# Patient Record
Sex: MALE | Race: WHITE | NOT HISPANIC OR LATINO | Employment: OTHER | URBAN - METROPOLITAN AREA
[De-identification: names, ages, dates, MRNs, and addresses within clinical notes are randomized per-mention and may not be internally consistent; named-entity substitution may affect disease eponyms.]

---

## 2020-09-26 ENCOUNTER — IMMUNIZATION (OUTPATIENT)
Dept: SOCIAL WORK | Facility: CLINIC | Age: 65
End: 2020-09-26
Payer: MEDICARE

## 2020-09-26 DIAGNOSIS — Z23 NEED FOR VACCINATION: ICD-10-CM

## 2020-09-26 PROCEDURE — G0008 ADMIN INFLUENZA VIRUS VAC: HCPCS | Performed by: REGISTERED NURSE

## 2020-09-26 PROCEDURE — 90732 PPSV23 VACC 2 YRS+ SUBQ/IM: CPT | Performed by: REGISTERED NURSE

## 2020-09-26 PROCEDURE — G0009 ADMIN PNEUMOCOCCAL VACCINE: HCPCS | Performed by: REGISTERED NURSE

## 2020-09-26 PROCEDURE — 90662 IIV NO PRSV INCREASED AG IM: CPT | Performed by: REGISTERED NURSE

## 2020-10-14 ENCOUNTER — HOSPITAL ENCOUNTER (OUTPATIENT)
Dept: LAB | Facility: MEDICAL CENTER | Age: 65
End: 2020-10-14
Attending: FAMILY MEDICINE
Payer: MEDICARE

## 2020-10-14 LAB
BASOPHILS # BLD AUTO: 1 % (ref 0–1.8)
BASOPHILS # BLD: 0.1 K/UL (ref 0–0.12)
CHOLEST SERPL-MCNC: 225 MG/DL (ref 100–199)
EOSINOPHIL # BLD AUTO: 0.24 K/UL (ref 0–0.51)
EOSINOPHIL NFR BLD: 2.4 % (ref 0–6.9)
ERYTHROCYTE [DISTWIDTH] IN BLOOD BY AUTOMATED COUNT: 45.3 FL (ref 35.9–50)
HCT VFR BLD AUTO: 48.4 % (ref 42–52)
HDLC SERPL-MCNC: 41 MG/DL
HGB BLD-MCNC: 16.5 G/DL (ref 14–18)
IMM GRANULOCYTES # BLD AUTO: 0.04 K/UL (ref 0–0.11)
IMM GRANULOCYTES NFR BLD AUTO: 0.4 % (ref 0–0.9)
LDLC SERPL CALC-MCNC: 139 MG/DL
LYMPHOCYTES # BLD AUTO: 2.39 K/UL (ref 1–4.8)
LYMPHOCYTES NFR BLD: 23.8 % (ref 22–41)
MCH RBC QN AUTO: 30.6 PG (ref 27–33)
MCHC RBC AUTO-ENTMCNC: 34.1 G/DL (ref 33.7–35.3)
MCV RBC AUTO: 89.8 FL (ref 81.4–97.8)
MONOCYTES # BLD AUTO: 0.8 K/UL (ref 0–0.85)
MONOCYTES NFR BLD AUTO: 8 % (ref 0–13.4)
NEUTROPHILS # BLD AUTO: 6.48 K/UL (ref 1.82–7.42)
NEUTROPHILS NFR BLD: 64.4 % (ref 44–72)
NRBC # BLD AUTO: 0 K/UL
NRBC BLD-RTO: 0 /100 WBC
PLATELET # BLD AUTO: 315 K/UL (ref 164–446)
PMV BLD AUTO: 9.9 FL (ref 9–12.9)
PSA SERPL-MCNC: 0.79 NG/ML (ref 0–4)
RBC # BLD AUTO: 5.39 M/UL (ref 4.7–6.1)
TRIGL SERPL-MCNC: 226 MG/DL (ref 0–149)
WBC # BLD AUTO: 10.1 K/UL (ref 4.8–10.8)

## 2020-10-14 PROCEDURE — 84153 ASSAY OF PSA TOTAL: CPT | Mod: GA

## 2020-10-14 PROCEDURE — 85025 COMPLETE CBC W/AUTO DIFF WBC: CPT

## 2020-10-14 PROCEDURE — 80061 LIPID PANEL: CPT | Mod: GA

## 2020-10-14 PROCEDURE — 83036 HEMOGLOBIN GLYCOSYLATED A1C: CPT | Mod: GA

## 2020-10-14 PROCEDURE — 36415 COLL VENOUS BLD VENIPUNCTURE: CPT | Mod: GA

## 2020-10-15 LAB
EST. AVERAGE GLUCOSE BLD GHB EST-MCNC: 114 MG/DL
HBA1C MFR BLD: 5.6 % (ref 0–5.6)

## 2020-12-22 ENCOUNTER — PATIENT OUTREACH (OUTPATIENT)
Dept: HEALTH INFORMATION MANAGEMENT | Facility: OTHER | Age: 65
End: 2020-12-22

## 2020-12-23 NOTE — PROGRESS NOTES
Outcome: No answer- Schedule with new PCP     Please transfer to [facility]  at [phone] when patient returns call.   [facility] Verified: yes   Attempt # 1

## 2021-01-29 ENCOUNTER — TELEPHONE (OUTPATIENT)
Dept: SCHEDULING | Facility: IMAGING CENTER | Age: 66
End: 2021-01-29

## 2021-02-05 ENCOUNTER — OFFICE VISIT (OUTPATIENT)
Dept: MEDICAL GROUP | Facility: MEDICAL CENTER | Age: 66
End: 2021-02-05
Payer: MEDICARE

## 2021-02-05 VITALS
BODY MASS INDEX: 36.82 KG/M2 | TEMPERATURE: 97.6 F | SYSTOLIC BLOOD PRESSURE: 116 MMHG | RESPIRATION RATE: 16 BRPM | WEIGHT: 263 LBS | DIASTOLIC BLOOD PRESSURE: 74 MMHG | HEART RATE: 70 BPM | HEIGHT: 71 IN | OXYGEN SATURATION: 97 %

## 2021-02-05 DIAGNOSIS — Z12.12 SCREENING FOR COLORECTAL CANCER: ICD-10-CM

## 2021-02-05 DIAGNOSIS — E66.9 OBESITY (BMI 30-39.9): ICD-10-CM

## 2021-02-05 DIAGNOSIS — Z12.11 SCREENING FOR COLORECTAL CANCER: ICD-10-CM

## 2021-02-05 DIAGNOSIS — Z11.59 NEED FOR HEPATITIS C SCREENING TEST: ICD-10-CM

## 2021-02-05 DIAGNOSIS — L29.9 EAR ITCHING: ICD-10-CM

## 2021-02-05 DIAGNOSIS — E78.5 DYSLIPIDEMIA: ICD-10-CM

## 2021-02-05 PROCEDURE — 99203 OFFICE O/P NEW LOW 30 MIN: CPT | Performed by: NURSE PRACTITIONER

## 2021-02-05 ASSESSMENT — PATIENT HEALTH QUESTIONNAIRE - PHQ9: CLINICAL INTERPRETATION OF PHQ2 SCORE: 0

## 2021-02-05 NOTE — PROGRESS NOTES
Subjective:      Hima Barrios is a 65 y.o. male who presents with Establish Care and Other (itching ears)        CC: This is a pleasant 65-year-old gentleman here today to establish care for issues including dyslipidemia and obesity.  He states had not been to a medical provider in years and was just recently going to a PCP in [place] until he changed insurances.    HPI       1. Dyslipidemia  Patient reports he did his first lab work in years and I am able to review the consult notes from his previous PCP as well as the lab results.  His LDL was 139 and total cholesterol 225 with elevated triglycerides of 226 and normal HDL at 41 when done 4 months ago..  His PCP talk to him about starting medicine because of his elevated 10-year cardiac risk score but he declined.  He would like to recheck his levels.    Patient's PSA was normal, CBC normal, and A1c at 5.6    2. Obesity (BMI 30-39.9)  BMI is elevated the patient states he has been trying to lose weight but has not been very successful.    3. Screening for colorectal cancer  Patient states it has been over 10 years since his last colonoscopy.    4. Need for hepatitis C screening test  Patient needs screening     5.  Ear itching:    Patient reports he has had on and off pruritus of his ear canals and his previous PCP prescribed Cipro otic which she could not afford filling.  He states there is no purulent discharge or deep ear pain but he does feel the need to scratch his ear canals frequently over the past year.  No past medical history on file.  Social History     Socioeconomic History   • Marital status:      Spouse name: Not on file   • Number of children: Not on file   • Years of education: Not on file   • Highest education level: Not on file   Occupational History   • Not on file   Social Needs   • Financial resource strain: Not on file   • Food insecurity     Worry: Not on file     Inability: Not on file   • Transportation needs     Medical:  "Not on file     Non-medical: Not on file   Tobacco Use   • Smoking status: Never Smoker   • Smokeless tobacco: Never Used   Substance and Sexual Activity   • Alcohol use: Yes     Comment: ocaccionaly   • Drug use: Yes     Types: Marijuana     Comment: daily   • Sexual activity: Not Currently     Partners: Female   Lifestyle   • Physical activity     Days per week: Not on file     Minutes per session: Not on file   • Stress: Not on file   Relationships   • Social connections     Talks on phone: Not on file     Gets together: Not on file     Attends Baptism service: Not on file     Active member of club or organization: Not on file     Attends meetings of clubs or organizations: Not on file     Relationship status: Not on file   • Intimate partner violence     Fear of current or ex partner: Not on file     Emotionally abused: Not on file     Physically abused: Not on file     Forced sexual activity: Not on file   Other Topics Concern   • Not on file   Social History Narrative   • Not on file     No current outpatient medications on file.     No current facility-administered medications for this visit.      Family History   Problem Relation Age of Onset   • Cancer Mother         lung   • Cancer Father         brain         Review of Systems   Skin: Positive for itching.   All other systems reviewed and are negative.         Objective:     /74 (BP Location: Right arm, Patient Position: Sitting, BP Cuff Size: Adult)   Pulse 70   Temp 36.4 °C (97.6 °F) (Temporal)   Resp 16   Ht 1.803 m (5' 11\")   Wt 119 kg (263 lb)   SpO2 97%   BMI 36.68 kg/m²      Physical Exam  Vitals signs and nursing note reviewed.   Constitutional:       General: He is not in acute distress.     Appearance: He is well-developed. He is not diaphoretic.   HENT:      Head: Normocephalic and atraumatic.      Comments: No obvious otitis media or otitis externa with small amount of cerumen in the canals bilaterally     Right Ear: External " ear normal.      Left Ear: External ear normal.      Nose: Nose normal.   Eyes:      General:         Right eye: No discharge.         Left eye: No discharge.      Conjunctiva/sclera: Conjunctivae normal.   Neck:      Musculoskeletal: Normal range of motion and neck supple.      Thyroid: No thyromegaly.      Trachea: No tracheal deviation.   Cardiovascular:      Rate and Rhythm: Normal rate and regular rhythm.      Heart sounds: Normal heart sounds. No murmur.   Pulmonary:      Effort: Pulmonary effort is normal. No respiratory distress.      Breath sounds: Normal breath sounds. No wheezing or rales.   Lymphadenopathy:      Cervical: No cervical adenopathy.   Skin:     General: Skin is warm and dry.      Findings: No erythema or rash.   Neurological:      Mental Status: He is alert and oriented to person, place, and time.      Coordination: Coordination normal.   Psychiatric:         Behavior: Behavior normal.         Thought Content: Thought content normal.         Judgment: Judgment normal.                 Assessment/Plan:        1. Dyslipidemia  Patient's 10-year cardiac risk assessment score is elevated at 13%.  I explained to him the implications of this for and the pros and cons of starting statin therapy as did his previous PCP.  I recommended weight loss and a low-fat diet.  He states he would prefer to again try dieting and losing weight and rechecking in 6 months.  I explained the risk of untreated dyslipidemia.  - Lipid Profile; Future  - Comp Metabolic Panel; Future    2. Ear itching  I recommended some over-the-counter hydrocortisone cream on a Q-tip to the areas bilaterally and if it does not improve I would refer him to ENT.    3. Obesity (BMI 30-39.9)    - Patient identified as having weight management issue.  Appropriate orders and counseling given.    4. Screening for colorectal cancer  I explained to patient the benefits of a colonoscopy over a Cologuard and he has not had a colonoscopy in over 10  years so I will place a referral today  - REFERRAL TO GI FOR COLONOSCOPY      5. Need for hepatitis C screening test  Patient will do hepatitis C screening

## 2021-03-03 DIAGNOSIS — Z23 NEED FOR VACCINATION: ICD-10-CM

## 2021-05-11 ENCOUNTER — PATIENT OUTREACH (OUTPATIENT)
Dept: HEALTH INFORMATION MANAGEMENT | Facility: OTHER | Age: 66
End: 2021-05-11

## 2021-10-07 ENCOUNTER — TELEMEDICINE (OUTPATIENT)
Dept: MEDICAL GROUP | Facility: MEDICAL CENTER | Age: 66
End: 2021-10-07
Payer: MEDICARE

## 2021-10-07 VITALS — TEMPERATURE: 96.9 F

## 2021-10-07 DIAGNOSIS — E66.9 OBESITY (BMI 30-39.9): ICD-10-CM

## 2021-10-07 DIAGNOSIS — E78.5 DYSLIPIDEMIA: ICD-10-CM

## 2021-10-07 DIAGNOSIS — L29.9 ITCHING OF EAR: ICD-10-CM

## 2021-10-07 PROCEDURE — 99213 OFFICE O/P EST LOW 20 MIN: CPT | Mod: 95 | Performed by: FAMILY MEDICINE

## 2021-10-07 NOTE — PROGRESS NOTES
Virtual Visit: Established Patient   This visit was conducted via Zoom  using secure and encrypted videoconferencing technology. The patient was in a private location in the state of [place].    The patient's identity was confirmed and verbal consent was obtained for this virtual visit.      CC: New patient: Hyperlipidemia, obesity, which is                                                                                                                               HPI:   Hima presents today with the following.    Dyslipidemia  Most lipid panel was checked in October 2020:     Ref Range & Units 11 mo ago   Cholesterol,Tot 100 - 199 mg/dL 225 High     Triglycerides 0 - 149 mg/dL 226 High     HDL >=40 mg/dL 41    LDL <100 mg/dL 139 High         No history of diabetes, CAD, or stroke.  Patient is currently not on medication.ASCAD score is 14%.      Obesity (BMI 30-39.9)  BMI is 36.The medical rationale for weight loss in obese individuals is that obesity is associated with a significant increase in mortality, and many health risks including type 2 diabetes mellitus, hypertension, dyslipidemia, and coronary heart disease. The benefits of weight loss include a reduction in the rate of progression from impaired glucose tolerance to diabetes, blood pressure in hypertensive patients, and lipid levels in higher risk patients. Other noncardiac benefits of weight loss include reductions in urinary incontinence, sleep apnea, and depression, and improvements in quality of life, physical functioning, and mobility.Recommend lifestyle modification: exercise 30 minutes per day 5 days per week. Recommend also portion control.    Itching of ear  Patient has been complaining of an itchy external ear canal for a while.  Denies any hearing problem, denies any dizziness or vertigo.  Patient is concerned, he wonders if he needs to be seen by ENT.  Discussed with patient that he needs face-to-face encounter to examine his ear, and decide  if he needs to be seen by ENT.        Patient Active Problem List    Diagnosis Date Noted   • Obesity (BMI 30-39.9) 02/05/2021   • Dyslipidemia 02/05/2021       No current outpatient medications on file.     No current facility-administered medications for this visit.         Allergies as of 10/07/2021   • (No Known Allergies)        ROS:  Denies, chest pain, Shortness of breath, Edema.     Temp 36.1 °C (96.9 °F)       Physical Exam:  Constitutional: Alert, no distress, well-groomed.  Skin: No rashes in visible areas.  Eye: Round. Conjunctiva clear, lNo icterus.   ENMT: Lips pink without lesions, good dentition, moist mucous membranes. Phonation normal.  Neck: No masses, no thyromegaly. Moves freely without pain.  CV: Pulse as reported by patient  Respiratory: Unlabored respiratory effort, no cough or audible wheeze  Psych: Alert and oriented x3, normal affect and mood.          Assessment and Plan.   66 y.o. male with the following issues.    1. Dyslipidemia  ASCAD score is 14%  Will discuss statin at next visit    2. Obesity (BMI 30-39.9)  BMI 36.  Patient is counseled on lifestyle modification    3. Itching of ear  Probably dryness of external ear.  He wonders if he needs to see an ENT.  Patient advised to make a face to face encounter to examine his ears and find out if he needs to see ENT

## 2021-10-27 ENCOUNTER — TELEPHONE (OUTPATIENT)
Dept: MEDICAL GROUP | Facility: MEDICAL CENTER | Age: 66
End: 2021-10-27

## 2021-10-27 NOTE — TELEPHONE ENCOUNTER
ESTABLISHED PATIENT PRE-VISIT PLANNING     Annual Wellness Visit Over Due    Phone Number Called: [phone] (home)   Call outcome: Spoke to patient regarding message below.  Message: Appointment scheduled with , Thursday, 10/28/2021 at 3:00 PM, [address], [address]. Request patient arrive 10-15 minutes early for check-in. Option for Virtual Visit declined.       Patient was contacted to complete PVP.     Note: Patient will not be contacted if there is no indication to call.     1.  Reviewed notes from the last few office visits within the medical group: Yes    2.  If any orders were placed at last visit or intended to be done for this visit (i.e. 6 mos follow-up), do we have Results/Consult Notes?         •  Labs - Labs ordered by prior PCP Betty, NOT completed. Patient advised to complete prior to next appointment.   -Labs: 8-10 hours fasting  During call to patient, patient indicated he has not been fasting, and labs will not be completed prior to visit scheduled tomorrow 10/28/21 with .  Note: If patient appointment is for lab review and patient did not complete labs, check with provider if OK to reschedule patient until labs completed.       •  Imaging - Imaging was not ordered at last office visit.       •  Referrals - Referral ordered, patient was seen and consult notes were requested from [facility]. Care Teams updated  YES.    -Colonoscopy: Called and spoke to [facility]. Per their office, patient was seen on  06/03/2021 with Dr. Myers. No future follow up appointments scheduled. Care Teams updated. Medical records requested.    3. Is this appointment scheduled as a Hospital Follow-Up? No    4.  Immunizations were updated in Epic using Reconcile Outside Information activity? Yes    5.  Patient is due for the following Health Maintenance Topics:   Health Maintenance Due   Topic Date Due   • Annual Wellness Visit  Never done   • COLORECTAL CANCER SCREENING   Never done   • HEPATITIS C SCREENING  Never done   • IMM INFLUENZA (1) 09/01/2021       6.  AHA (Pulse8) form printed for Provider? No, patient does not have any open alerts

## 2021-10-27 NOTE — LETTER
[facility]  Jose Kim M.D.  [address]  [place] [address]  Fax: [phone]   Authorization for Release/Disclosure of   Protected Health Information   Name: HENRIQUE MALCOLM : 1955 SSN: [id number]   Address: [address] [place] [address] [address]  [place] [address] Phone:    [phone] (home)    I authorize the entity listed below to release/disclose the PHI below to:   [facility]/Jose Kim M.D. and Jose Kim M.D.   Provider or Entity Name:  Dr. Vinny Myers M.D.   Address   [facility]   Phone:  [phone]    Fax:  [phone]   Reason for request: continuity of care   Information to be released:    [  ] LAST COLONOSCOPY,  including any PATH REPORT and follow-up  [  ] LAST FIT/COLOGUARD RESULT [  ] LAST DEXA  [  ] LAST MAMMOGRAM  [  ] LAST PAP  [  ] LAST LABS [  ] RETINA EXAM REPORT  [  ] IMMUNIZATION RECORDS  [XXXX ] Release all info      [  ] Check here and initial the line next to each item to release ALL health information INCLUDING  _____ Care and treatment for drug and / or alcohol abuse  _____ HIV testing, infection status, or AIDS  _____ Genetic Testing    DATES OF SERVICE OR TIME PERIOD TO BE DISCLOSED: _____________  I understand and acknowledge that:  * This Authorization may be revoked at any time by you in writing, except if your health information has already been used or disclosed.  * Your health information that will be used or disclosed as a result of you signing this authorization could be re-disclosed by the recipient. If this occurs, your re-disclosed health information may no longer be protected by State or Federal laws.  * You may refuse to sign this Authorization. Your refusal will not affect your ability to obtain treatment.  * This Authorization becomes effective upon signing and will  on (date) __________.      If no date is indicated, this Authorization will  one (1) year from the signature date.    Name:  Hima Barrios    Signature: Continuity of Care Date:     10/27/2021       PLEASE FAX REQUESTED RECORDS BACK TO: [phone]

## 2021-10-28 ENCOUNTER — OFFICE VISIT (OUTPATIENT)
Dept: MEDICAL GROUP | Facility: MEDICAL CENTER | Age: 66
End: 2021-10-28
Payer: MEDICARE

## 2021-10-28 VITALS
TEMPERATURE: 98.1 F | SYSTOLIC BLOOD PRESSURE: 120 MMHG | WEIGHT: 261.25 LBS | DIASTOLIC BLOOD PRESSURE: 70 MMHG | OXYGEN SATURATION: 96 % | HEART RATE: 92 BPM | BODY MASS INDEX: 36.57 KG/M2 | HEIGHT: 71 IN | RESPIRATION RATE: 16 BRPM

## 2021-10-28 DIAGNOSIS — H92.02 EARACHE ON LEFT: ICD-10-CM

## 2021-10-28 DIAGNOSIS — L29.9 ITCHING OF EAR: ICD-10-CM

## 2021-10-28 PROCEDURE — 99214 OFFICE O/P EST MOD 30 MIN: CPT | Performed by: FAMILY MEDICINE

## 2021-10-28 NOTE — PROGRESS NOTES
"CC: itchy ear/ earache    HPI:   Hima was seen recently in a virtual visit for an itchy ear/ left ear ache. Advised to be seen in person to be examined properly, that is why he is here today. Patient has been having this problem for a while, recently gets worse . Denies any hearing problem, denies any dizziness or vertigo.        Patient Active Problem List    Diagnosis Date Noted   • Obesity (BMI 30-39.9) 02/05/2021   • Dyslipidemia 02/05/2021       No current outpatient medications on file.     No current facility-administered medications for this visit.         Allergies as of 10/28/2021   • (No Known Allergies)        ROS: Denies any chest pain, Shortness of breath, Changes bowel or bladder, Lower extremity edema.    Physical Exam:  /70 (BP Location: Right arm, Patient Position: Sitting, BP Cuff Size: Adult)   Pulse 92   Temp 36.7 °C (98.1 °F) (Temporal)   Resp 16   Ht 1.803 m (5' 11\")   Wt 118 kg (261 lb 3.9 oz)   SpO2 96%   BMI 36.44 kg/m²   Gen.: Well-developed, well-nourished, no apparent distress,pleasant and cooperative with the examination  Ears: Normal right ear, left ear: Unable to see the tympanic membrane ,possible perforated tympanic membrane with a lesion, .        Assessment and Plan.   66 y.o. male     1. Itching of ear/ Earache on left  Possible perforated tympanic membrane with a lesion, possible cholesteatoma.    - Referral to ENT      "

## 2021-11-03 ENCOUNTER — TELEPHONE (OUTPATIENT)
Dept: MEDICAL GROUP | Facility: MEDICAL CENTER | Age: 66
End: 2021-11-03

## 2021-11-03 NOTE — TELEPHONE ENCOUNTER
I do not think the process for that.  However I placed a referral for patient already when I saw him recently.  But other things do I need to do?

## 2021-11-03 NOTE — TELEPHONE ENCOUNTER
Nithya from [facility] left a voicemail    Patient needs and authorization to see a specialist outside of [place], he is going out of town and needs authorization for a referral and chart notes to say it medically necessary to see a specialist. Please advise

## 2022-02-16 ENCOUNTER — PATIENT MESSAGE (OUTPATIENT)
Dept: HEALTH INFORMATION MANAGEMENT | Facility: OTHER | Age: 67
End: 2022-02-16
Payer: MEDICARE

## 2022-05-09 PROBLEM — R73.9 HYPERGLYCEMIA: Status: ACTIVE | Noted: 2022-05-09

## 2022-05-09 PROBLEM — R73.03 PRE-DIABETES: Status: ACTIVE | Noted: 2022-05-09

## 2022-05-09 PROBLEM — R03.0 ELEVATED BLOOD PRESSURE READING WITHOUT DIAGNOSIS OF HYPERTENSION: Status: ACTIVE | Noted: 2022-05-09

## 2022-05-09 PROBLEM — E66.9 OBESITY (BMI 30-39.9): Status: RESOLVED | Noted: 2021-02-05 | Resolved: 2022-05-09

## 2022-05-09 PROBLEM — F12.90 MARIJUANA USE: Status: ACTIVE | Noted: 2022-05-09

## 2022-05-09 PROBLEM — I73.9 PERIPHERAL ARTERIAL DISEASE (HCC): Status: ACTIVE | Noted: 2022-05-09

## 2022-05-09 PROBLEM — E66.01 MORBID OBESITY (HCC): Status: ACTIVE | Noted: 2022-05-09

## 2022-05-09 PROBLEM — E88.810 DYSMETABOLIC SYNDROME: Status: ACTIVE | Noted: 2022-05-09

## 2022-05-20 ENCOUNTER — TELEPHONE (OUTPATIENT)
Dept: MEDICAL GROUP | Facility: MEDICAL CENTER | Age: 67
End: 2022-05-20
Payer: MEDICARE

## 2022-05-20 NOTE — TELEPHONE ENCOUNTER
ESTABLISHED PATIENT PRE-VISIT PLANNING     Patient was NOT contacted to complete PVP.     Note: Patient will not be contacted if there is no indication to call.     1.  Reviewed notes from the last few office visits within the medical group: Yes    2.  If any orders were placed at last visit or intended to be done for this visit (i.e. 6 mos follow-up), do we have Results/Consult Notes?         •  Labs - Labs were not ordered at last office visit.   Last office visit with PCP Provider Dr. Kim was on 10/28/2021.   Note: If patient appointment is for lab review and patient did not complete labs, check with provider if OK to reschedule patient until labs completed.       •  Imaging - Imaging was not ordered at last office visit.          •  Referrals - Referral ordered, patient has NOT been seen.    -Otolaryngology: Called and spoke to [facility]. Per their office, patient not seen and not in their system. Referral Status Authorized.     3. Is this appointment scheduled as a Hospital Follow-Up? No    4.  Immunizations were updated in Epic using Reconcile Outside Information activity? Yes    5.  Patient is due for the following Health Maintenance Topics:   Health Maintenance Due   Topic Date Due   • HEPATITIS C SCREENING  Never done   • COVID-19 Vaccine (3 - Booster for Pfizer series) 08/22/2021   • IMM PNEUMOCOCCAL VACCINE: 65+ Years (2 - PCV) 09/26/2021       6.  AHA (Pulse8) form printed for Provider? No, patient does not have any open alerts, Compliant

## 2022-05-23 ENCOUNTER — APPOINTMENT (OUTPATIENT)
Dept: MEDICAL GROUP | Facility: MEDICAL CENTER | Age: 67
End: 2022-05-23
Payer: MEDICARE

## 2022-05-25 ENCOUNTER — OFFICE VISIT (OUTPATIENT)
Dept: MEDICAL GROUP | Facility: MEDICAL CENTER | Age: 67
End: 2022-05-25
Payer: MEDICARE

## 2022-05-25 VITALS
TEMPERATURE: 98.1 F | SYSTOLIC BLOOD PRESSURE: 110 MMHG | HEART RATE: 114 BPM | OXYGEN SATURATION: 94 % | DIASTOLIC BLOOD PRESSURE: 60 MMHG | RESPIRATION RATE: 16 BRPM | WEIGHT: 271 LBS | BODY MASS INDEX: 37.94 KG/M2 | HEIGHT: 71 IN

## 2022-05-25 DIAGNOSIS — E78.5 DYSLIPIDEMIA: ICD-10-CM

## 2022-05-25 DIAGNOSIS — E66.01 SEVERE OBESITY (BMI 35.0-39.9) WITH COMORBIDITY (HCC): ICD-10-CM

## 2022-05-25 DIAGNOSIS — Z00.00 MEDICARE ANNUAL WELLNESS VISIT, SUBSEQUENT: ICD-10-CM

## 2022-05-25 DIAGNOSIS — R73.03 PREDIABETES: ICD-10-CM

## 2022-05-25 DIAGNOSIS — Z13.0 SCREENING FOR DEFICIENCY ANEMIA: ICD-10-CM

## 2022-05-25 PROBLEM — R03.0 ELEVATED BLOOD PRESSURE READING WITHOUT DIAGNOSIS OF HYPERTENSION: Status: RESOLVED | Noted: 2022-05-09 | Resolved: 2022-05-25

## 2022-05-25 PROBLEM — I73.9 PERIPHERAL ARTERIAL DISEASE (HCC): Status: RESOLVED | Noted: 2022-05-09 | Resolved: 2022-05-25

## 2022-05-25 PROBLEM — E88.810 DYSMETABOLIC SYNDROME: Status: RESOLVED | Noted: 2022-05-09 | Resolved: 2022-05-25

## 2022-05-25 PROCEDURE — G0439 PPPS, SUBSEQ VISIT: HCPCS | Performed by: FAMILY MEDICINE

## 2022-05-25 ASSESSMENT — PATIENT HEALTH QUESTIONNAIRE - PHQ9: CLINICAL INTERPRETATION OF PHQ2 SCORE: 0

## 2022-05-25 ASSESSMENT — ENCOUNTER SYMPTOMS: GENERAL WELL-BEING: GOOD

## 2022-05-25 ASSESSMENT — ACTIVITIES OF DAILY LIVING (ADL): BATHING_REQUIRES_ASSISTANCE: 0

## 2022-05-25 NOTE — PROGRESS NOTES
Chief Complaint   Patient presents with   • Annual Exam       HPI:  Hima Barrios is a 67 y.o. here for Medicare Annual Wellness Visit     Patient Active Problem List    Diagnosis Date Noted   • Dysmetabolic syndrome 05/09/2022   • Pre-diabetes 05/09/2022   • Marijuana use 05/09/2022   • Peripheral arterial disease (HCC) 05/09/2022   • Elevated blood pressure reading without diagnosis of hypertension 05/09/2022   • Morbid obesity (HCC) 05/09/2022   • Dyslipidemia 02/05/2021       No current outpatient medications on file.     No current facility-administered medications for this visit.          Current supplements as per medication list.     Allergies: Patient has no known allergies.    Current social contact/activities:      He  reports that he has never smoked. He has never used smokeless tobacco. He reports current alcohol use. He reports current drug use. Drug: Marijuana.  Counseling given: Not Answered      DPA/Advanced Directive:  Patient does not have an Advanced Directive.  A packet and workshop information was given on Advanced Directives.    ROS:    Gait: Uses no assistive device  Ostomy: No  Other tubes: No  Amputations: No  Chronic oxygen use: No  Last eye exam: 2019  Wears hearing aids: No   : Denies any urinary leakage during the last 6 months    Screening:    Depression Screening  Little interest or pleasure in doing things?  0 - not at all  Feeling down, depressed , or hopeless? 0 - not at all  Patient Health Questionnaire Score: 0     If depressive symptoms identified deferred to follow up visit unless specifically addressed in assessment and plan.    Interpretation of PHQ-9 Total Score   Score Severity   1-4 No Depression   5-9 Mild Depression   10-14 Moderate Depression   15-19 Moderately Severe Depression   20-27 Severe Depression    Screening for Cognitive Impairment  Three Minute Recall (daughter, heaven, mountain) 3/3    Guillermo clock face with all 12 numbers and set the hands to show 10 past  11.  Yes    Cognitive concerns identified deferred for follow up unless specifically addressed in assessment and plan.    Fall Risk Assessment  Has the patient had two or more falls in the last year or any fall with injury in the last year?  No    Safety Assessment  Throw rugs on floor.  No  Handrails on all stairs.  No  Good lighting in all hallways.  Yes  Difficulty hearing.  No  Patient counseled about all safety risks that were identified.    Functional Assessment ADLs  Are there any barriers preventing you from cooking for yourself or meeting nutritional needs?  No.    Are there any barriers preventing you from driving safely or obtaining transportation?  No.    Are there any barriers preventing you from using a telephone or calling for help?  No.    Are there any barriers preventing you from shopping?  No.    Are there any barriers preventing you from taking care of your own finances?  No.    Are there any barriers preventing you from managing your medications?  No.    Are there any barriers preventing you from showering, bathing or dressing yourself?  No.    Are you currently engaging in any exercise or physical activity?  Yes.     What is your perception of your health?  Good.      Health Maintenance Summary          Overdue - HEPATITIS C SCREENING (Once) Overdue - never done    No completion history exists for this topic.          Overdue - COVID-19 Vaccine (3 - Booster for Pfizer series) Overdue since 8/22/2021 03/22/2021  Imm Admin: PFIZER PURPLE CAP SARS-COV-2 VACCINATION (12+)    03/01/2021  Imm Admin: PFIZER PURPLE CAP SARS-COV-2 VACCINATION (12+)          Overdue - IMM PNEUMOCOCCAL VACCINE: 65+ Years (2 - PCV) Overdue since 9/26/2021 09/26/2020  Imm Admin: Pneumococcal polysaccharide vaccine (PPSV-23)          Postponed - IMM DTaP/Tdap/Td Vaccine (1 - Tdap) Postponed until 6/8/2024    No completion history exists for this topic.          Postponed - IMM ZOSTER VACCINES (1 of 2) Postponed until  8/11/2025    No completion history exists for this topic.          IMM INFLUENZA (Season Ended) Next due on 9/1/2022 09/26/2020  Imm Admin: Influenza Vaccine Adult HD          COLORECTAL CANCER SCREENING (COLONOSCOPY - Every 10 Years) Tentatively due on 6/3/2031    06/03/2021  REFERRAL TO GI FOR COLONOSCOPY          Annual Wellness Visit  Completed    05/09/2022  Level of Service: ANNUAL WELLNESS VISIT-INCLUDES PPPS SUBSEQUE*          IMM HEP B VACCINE (Series Information) Aged Out    No completion history exists for this topic.          IMM MENINGOCOCCAL VACCINE (MCV4) (Series Information) Aged Out    No completion history exists for this topic.                Patient Care Team:  Jose Kim M.D. as PCP - General (Geriatric Medicine - Family Medicine)  Henry Parks M.D. as Attending Team Physician (Geriatrics)  Vinny Myers M.D. as Attending Team Physician (Gastroenterology)        Social History     Tobacco Use   • Smoking status: Never Smoker   • Smokeless tobacco: Never Used   Vaping Use   • Vaping Use: Never used   Substance Use Topics   • Alcohol use: Yes     Comment: ocaccionaly   • Drug use: Yes     Types: Marijuana     Comment: daily     Family History   Problem Relation Age of Onset   • Cancer Mother         lung   • Cancer Father         brain     He  has a past medical history of Obesity (BMI 30-39.9) (2/5/2021).   Past Surgical History:   Procedure Laterality Date   • APPENDECTOMY         Exam:   There were no vitals taken for this visit. There is no height or weight on file to calculate BMI.    Hearing excellent.    Dentition good  Alert, oriented in no acute distress.  Eye contact is good, speech goal directed, affect calm    Assessment and Plan. The following treatment and monitoring plan is recommended:    67 y.o. male     1. Dyslipidemia  Last lipid panel was checked in 2020:  Component Ref Range & Units 1 yr ago    Cholesterol,Tot 100 - 199 mg/dL 225 High      Triglycerides 0 - 149 mg/dL 226 High     HDL >=40 mg/dL 41    LDL <100 mg/dL 139 High     Resulting Agency  [facility]     Currently not on medication.  Patient is counseled on lifestyle modification.  Repeat lipid panel.  Patient advised to return to the clinic to discuss the result.    - Lipid Profile; Future  - Subsequent Annual Wellness Visit - Includes PPPS ()    2. Severe obesity (BMI 35.0-39.9) with comorbidity (HCC)  BMI is 38.  Patient is counseled on lifestyle modification.  We will continue monitor    - Subsequent Annual Wellness Visit - Includes PPPS ()    3. Screening for deficiency anemia    - CBC WITH DIFFERENTIAL; Future  - Subsequent Annual Wellness Visit - Includes PPPS ()    4. Prediabetes  With history of prediabetes, however patient has been asymptomatic.  Not on medication.  We will continue monitor blood glucose    - Comp Metabolic Panel; Future  - Subsequent Annual Wellness Visit - Includes PPPS ()  - A1C    5. Medicare annual wellness visit, subsequent  Preventive measures and chronic medical issues reviewed.    - Subsequent Annual Wellness Visit - Includes PPPS ()        Services suggested: No services needed at this time  Health Care Screening: Age-appropriate preventive services recommended by USPTF and ACIP covered by Medicare were discussed today. Services ordered if indicated and agreed upon by the patient.  Referrals offered: Community-based lifestyle interventions to reduce health risks and promote self-management and wellness, fall prevention, nutrition, physical activity, tobacco-use cessation, weight loss, and mental health services as per orders if indicated.    Discussion today about general wellness and lifestyle habits:    · Prevent falls and reduce trip hazards; Cautioned about securing or removing rugs.  · Have a working fire alarm and carbon monoxide detector;   · Engage in regular physical activity and social activities     Follow-up: No follow-ups on  file.

## 2022-05-26 NOTE — PROGRESS NOTES
Outcome:Comprehensive Geriatric Assessment  Wednesday, May 19, 2021  11:00 am (1 hour)    Dr. Parks - [address] [facility]     Please transfer to Patient Outreach Team at [phone] when patient returns call.      Attempt #1      Detail Level: Zone Detail Level: Detailed Detail Level: Simple

## 2022-06-07 ENCOUNTER — HOSPITAL ENCOUNTER (OUTPATIENT)
Dept: LAB | Facility: MEDICAL CENTER | Age: 67
End: 2022-06-07
Attending: FAMILY MEDICINE
Payer: MEDICARE

## 2022-06-07 DIAGNOSIS — R73.03 PREDIABETES: ICD-10-CM

## 2022-06-07 DIAGNOSIS — E78.5 DYSLIPIDEMIA: ICD-10-CM

## 2022-06-07 DIAGNOSIS — Z13.0 SCREENING FOR DEFICIENCY ANEMIA: ICD-10-CM

## 2022-06-07 LAB
ALBUMIN SERPL BCP-MCNC: 4.2 G/DL (ref 3.2–4.9)
ALBUMIN/GLOB SERPL: 1.4 G/DL
ALP SERPL-CCNC: 88 U/L (ref 30–99)
ALT SERPL-CCNC: 33 U/L (ref 2–50)
ANION GAP SERPL CALC-SCNC: 12 MMOL/L (ref 7–16)
AST SERPL-CCNC: 23 U/L (ref 12–45)
BASOPHILS # BLD AUTO: 1.1 % (ref 0–1.8)
BASOPHILS # BLD: 0.08 K/UL (ref 0–0.12)
BILIRUB SERPL-MCNC: 0.6 MG/DL (ref 0.1–1.5)
BUN SERPL-MCNC: 20 MG/DL (ref 8–22)
CALCIUM SERPL-MCNC: 9.1 MG/DL (ref 8.5–10.5)
CHLORIDE SERPL-SCNC: 104 MMOL/L (ref 96–112)
CHOLEST SERPL-MCNC: 200 MG/DL (ref 100–199)
CO2 SERPL-SCNC: 22 MMOL/L (ref 20–33)
CREAT SERPL-MCNC: 1.06 MG/DL (ref 0.5–1.4)
EOSINOPHIL # BLD AUTO: 0.23 K/UL (ref 0–0.51)
EOSINOPHIL NFR BLD: 3.3 % (ref 0–6.9)
ERYTHROCYTE [DISTWIDTH] IN BLOOD BY AUTOMATED COUNT: 46.9 FL (ref 35.9–50)
EST. AVERAGE GLUCOSE BLD GHB EST-MCNC: 120 MG/DL
GFR SERPLBLD CREATININE-BSD FMLA CKD-EPI: 77 ML/MIN/1.73 M 2
GLOBULIN SER CALC-MCNC: 3 G/DL (ref 1.9–3.5)
GLUCOSE SERPL-MCNC: 100 MG/DL (ref 65–99)
HBA1C MFR BLD: 5.8 % (ref 4–5.6)
HCT VFR BLD AUTO: 49.5 % (ref 42–52)
HDLC SERPL-MCNC: 39 MG/DL
HGB BLD-MCNC: 16.9 G/DL (ref 14–18)
IMM GRANULOCYTES # BLD AUTO: 0.03 K/UL (ref 0–0.11)
IMM GRANULOCYTES NFR BLD AUTO: 0.4 % (ref 0–0.9)
LDLC SERPL CALC-MCNC: 126 MG/DL
LYMPHOCYTES # BLD AUTO: 1.84 K/UL (ref 1–4.8)
LYMPHOCYTES NFR BLD: 26.1 % (ref 22–41)
MCH RBC QN AUTO: 30.6 PG (ref 27–33)
MCHC RBC AUTO-ENTMCNC: 34.1 G/DL (ref 33.7–35.3)
MCV RBC AUTO: 89.7 FL (ref 81.4–97.8)
MONOCYTES # BLD AUTO: 0.53 K/UL (ref 0–0.85)
MONOCYTES NFR BLD AUTO: 7.5 % (ref 0–13.4)
NEUTROPHILS # BLD AUTO: 4.33 K/UL (ref 1.82–7.42)
NEUTROPHILS NFR BLD: 61.6 % (ref 44–72)
NRBC # BLD AUTO: 0 K/UL
NRBC BLD-RTO: 0 /100 WBC
PLATELET # BLD AUTO: 300 K/UL (ref 164–446)
PMV BLD AUTO: 9.6 FL (ref 9–12.9)
POTASSIUM SERPL-SCNC: 4.8 MMOL/L (ref 3.6–5.5)
PROT SERPL-MCNC: 7.2 G/DL (ref 6–8.2)
RBC # BLD AUTO: 5.52 M/UL (ref 4.7–6.1)
SODIUM SERPL-SCNC: 138 MMOL/L (ref 135–145)
TRIGL SERPL-MCNC: 174 MG/DL (ref 0–149)
WBC # BLD AUTO: 7 K/UL (ref 4.8–10.8)

## 2022-06-07 PROCEDURE — 80053 COMPREHEN METABOLIC PANEL: CPT

## 2022-06-07 PROCEDURE — 83036 HEMOGLOBIN GLYCOSYLATED A1C: CPT

## 2022-06-07 PROCEDURE — 85025 COMPLETE CBC W/AUTO DIFF WBC: CPT

## 2022-06-07 PROCEDURE — 36415 COLL VENOUS BLD VENIPUNCTURE: CPT

## 2022-06-07 PROCEDURE — 80061 LIPID PANEL: CPT

## 2022-06-13 ENCOUNTER — OFFICE VISIT (OUTPATIENT)
Dept: MEDICAL GROUP | Facility: MEDICAL CENTER | Age: 67
End: 2022-06-13
Payer: MEDICARE

## 2022-06-13 VITALS
RESPIRATION RATE: 16 BRPM | SYSTOLIC BLOOD PRESSURE: 126 MMHG | HEIGHT: 71 IN | OXYGEN SATURATION: 95 % | WEIGHT: 268 LBS | TEMPERATURE: 97.6 F | HEART RATE: 92 BPM | BODY MASS INDEX: 37.52 KG/M2 | DIASTOLIC BLOOD PRESSURE: 74 MMHG

## 2022-06-13 DIAGNOSIS — E78.5 DYSLIPIDEMIA: ICD-10-CM

## 2022-06-13 DIAGNOSIS — R73.02 IGT (IMPAIRED GLUCOSE TOLERANCE): ICD-10-CM

## 2022-06-13 PROCEDURE — 99214 OFFICE O/P EST MOD 30 MIN: CPT | Performed by: FAMILY MEDICINE

## 2022-06-13 RX ORDER — ROSUVASTATIN CALCIUM 10 MG/1
10 TABLET, COATED ORAL EVERY EVENING
Qty: 90 TABLET | Refills: 1 | Status: SHIPPED
Start: 2022-06-13 | End: 2023-01-27

## 2022-06-13 ASSESSMENT — FIBROSIS 4 INDEX: FIB4 SCORE: 0.89

## 2022-06-13 NOTE — PROGRESS NOTES
"CC: Abnormal lab result    HPI:   Hima presents today to discuss the following lab results,    Dyslipidemia  Most recent lipid panel showed:    Component Ref Range & Units 6 d ago 1 yr ago   Cholesterol,Tot 100 - 199 mg/dL 200 High   225 High     Triglycerides 0 - 149 mg/dL 174 High   226 High     HDL >=40 mg/dL 39 Abnormal   41    LDL <100 mg/dL 126 High   139 High     Resulting Agency  [facility]     ASCVD risk is 16.3%.  However patient has no history of diabetes, CAD, or stroke.  Discussed with patient starting of statin, he is interested.  So patient was started on rosuvastatin 10 mg daily.  He will continue monitor lipid panel, liver function    IGT (impaired glucose tolerance)  Patient denies polyuria, polydipsia.  Patient's most recent A1c was 5.8.  No history of diabetes, and not on medication.    Patient Active Problem List    Diagnosis Date Noted   • Marijuana use 05/09/2022   • Severe obesity (BMI 35.0-39.9) with comorbidity (HCC) 05/09/2022   • Dyslipidemia 02/05/2021       Current Outpatient Medications   Medication Sig Dispense Refill   • rosuvastatin (CRESTOR) 10 MG Tab Take 1 Tablet by mouth every evening. 90 Tablet 1     No current facility-administered medications for this visit.         Allergies as of 06/13/2022   • (No Known Allergies)        ROS: Denies any chest pain, Shortness of breath, Changes bowel or bladder, Lower extremity edema.    Physical Exam:  /74 (BP Location: Right arm, Patient Position: Sitting, BP Cuff Size: Adult)   Pulse 92   Temp 36.4 °C (97.6 °F) (Temporal)   Resp 16   Ht 1.791 m (5' 10.5\")   Wt 122 kg (268 lb)   SpO2 95%   BMI 37.91 kg/m²   Gen.: Well-developed, well-nourished, no apparent distress,pleasant and cooperative with the examination  Skin:  Warm and dry with good turgor. No rashes or suspicious lesions in visible areas  No other physical exam is needed.  Patient was seen 3 weeks ago.          Assessment and Plan.   67 y.o. male     1. " Dyslipidemia  Most recent lipid panel showed high total cholesterol, LDL and triglycerides.ASCVD risk is 16.3%  We will start patient on rosuvastatin 10 mg.    - rosuvastatin (CRESTOR) 10 MG Tab; Take 1 Tablet by mouth every evening.  Dispense: 90 Tablet; Refill: 1  - Lipid Profile; Future    2. IGT (impaired glucose tolerance)  Most recent A1c was 5.8.  Patient is counseled on lifestyle modification  Will continue monitor blood glucose

## 2022-11-04 ENCOUNTER — PATIENT MESSAGE (OUTPATIENT)
Dept: HEALTH INFORMATION MANAGEMENT | Facility: OTHER | Age: 67
End: 2022-11-04

## 2022-12-20 ENCOUNTER — DOCUMENTATION (OUTPATIENT)
Dept: HEALTH INFORMATION MANAGEMENT | Facility: OTHER | Age: 67
End: 2022-12-20
Payer: MEDICARE

## 2023-01-11 ENCOUNTER — TELEPHONE (OUTPATIENT)
Dept: SCHEDULING | Facility: IMAGING CENTER | Age: 68
End: 2023-01-11
Payer: MEDICARE

## 2023-01-27 PROBLEM — I77.9 DISORDER OF ARTERIES AND ARTERIOLES, UNSPECIFIED (HCC): Status: ACTIVE | Noted: 2023-01-27

## 2023-03-01 ENCOUNTER — TELEMEDICINE (OUTPATIENT)
Dept: MEDICAL GROUP | Facility: MEDICAL CENTER | Age: 68
End: 2023-03-01
Payer: MEDICARE

## 2023-03-01 DIAGNOSIS — E78.5 DYSLIPIDEMIA: ICD-10-CM

## 2023-03-01 DIAGNOSIS — R73.02 IGT (IMPAIRED GLUCOSE TOLERANCE): ICD-10-CM

## 2023-03-01 DIAGNOSIS — Z00.00 MEDICARE ANNUAL WELLNESS VISIT, SUBSEQUENT: ICD-10-CM

## 2023-03-01 DIAGNOSIS — E66.01 SEVERE OBESITY (BMI 35.0-39.9) WITH COMORBIDITY (HCC): ICD-10-CM

## 2023-03-01 DIAGNOSIS — Z13.0 SCREENING FOR DEFICIENCY ANEMIA: ICD-10-CM

## 2023-03-01 PROCEDURE — 99213 OFFICE O/P EST LOW 20 MIN: CPT | Mod: 95 | Performed by: FAMILY MEDICINE

## 2023-03-01 ASSESSMENT — PATIENT HEALTH QUESTIONNAIRE - PHQ9: CLINICAL INTERPRETATION OF PHQ2 SCORE: 0

## 2023-03-01 ASSESSMENT — ENCOUNTER SYMPTOMS: GENERAL WELL-BEING: GOOD

## 2023-03-01 ASSESSMENT — ACTIVITIES OF DAILY LIVING (ADL): BATHING_REQUIRES_ASSISTANCE: 0

## 2023-03-01 NOTE — PROGRESS NOTES
Virtual Visit: Established Patient   This visit was conducted via Zoom using secure and encrypted videoconferencing technology.   The patient was in their home in the [facility].    The patient's identity was confirmed and verbal consent was obtained for this virtual visit.     Subjective:   CC:   Chief Complaint   Patient presents with    Annual Exam       Hima Barrios is a 67 y.o. male presenting for annual wellness      HPI:  Hima Barrios is a 67 y.o. here for Medicare Annual Wellness Visit     Patient Active Problem List    Diagnosis Date Noted    Disorder of arteries and arterioles, unspecified (HCC) 01/27/2023    IGT (impaired glucose tolerance) 06/13/2022    Marijuana use 05/09/2022    Severe obesity (BMI 35.0-39.9) with comorbidity (HCC) 05/09/2022    Dyslipidemia 02/05/2021       Current Outpatient Medications   Medication Sig Dispense Refill    ibuprofen (MOTRIN) 200 MG Tab Take 200 mg by mouth every 8 hours as needed.       No current facility-administered medications for this visit.          Current supplements as per medication list.     Allergies: Patient has no known allergies.    Current social contact/activities:      He  reports that he quit smoking about 26 years ago. His smoking use included cigarettes. He started smoking about 52 years ago. He has a 39.00 pack-year smoking history. He has never used smokeless tobacco. He reports current alcohol use. He reports current drug use. Drug: Marijuana.  Counseling given: Not Answered      ROS:    Gait: Uses no assistive device  Ostomy: No  Other tubes: No  Amputations: No  Chronic oxygen use: No  Last eye exam: 2022  Wears hearing aids: No   : Denies any urinary leakage during the last 6 months    Screening:    Depression Screening  Little interest or pleasure in doing things?  0 - not at all  Feeling down, depressed , or hopeless? 0 - not at all  Patient Health Questionnaire Score: 0     If depressive symptoms identified deferred to follow  up visit unless specifically addressed in assessment and plan.    Interpretation of PHQ-9 Total Score   Score Severity   1-4 No Depression   5-9 Mild Depression   10-14 Moderate Depression   15-19 Moderately Severe Depression   20-27 Severe Depression    Screening for Cognitive Impairment  Three Minute Recall (daughter, heaven, mountain) 3/3    Guillermo clock face with all 12 numbers and set the hands to show 10 past 11.  Yes    Cognitive concerns identified deferred for follow up unless specifically addressed in assessment and plan.    Fall Risk Assessment  Has the patient had two or more falls in the last year or any fall with injury in the last year?  No    Safety Assessment  Throw rugs on floor.  No  Handrails on all stairs.  Yes  Good lighting in all hallways.  Yes  Difficulty hearing.  No  Patient counseled about all safety risks that were identified.    Functional Assessment ADLs  Are there any barriers preventing you from cooking for yourself or meeting nutritional needs?  No.    Are there any barriers preventing you from driving safely or obtaining transportation?  No.    Are there any barriers preventing you from using a telephone or calling for help?  No.    Are there any barriers preventing you from shopping?  No.    Are there any barriers preventing you from taking care of your own finances?  No.    Are there any barriers preventing you from managing your medications?  No.    Are there any barriers preventing you from showering, bathing or dressing yourself?  No.    Are you currently engaging in any exercise or physical activity?  No.     What is your perception of your health?  Good    Advance Care Planning  Do you have an Advance Directive, Living Will, Durable Power of , or POLST? No                 Health Maintenance Summary            Overdue - HEPATITIS C SCREENING (Once) Overdue - never done      No completion history exists for this topic.              Overdue - ABDOMINAL AORTIC ANEURYSM  (AAA) SCREEN (Once) Overdue - never done      No completion history exists for this topic.              Overdue - COVID-19 Vaccine (3 - Booster for Pfizer series) Overdue since 5/17/2021 03/22/2021  Imm Admin: PFIZER PURPLE CAP SARS-COV-2 VACCINATION (12+)    03/01/2021  Imm Admin: PFIZER PURPLE CAP SARS-COV-2 VACCINATION (12+)              Overdue - IMM PNEUMOCOCCAL VACCINE: 65+ Years (2 - PCV) Overdue since 9/26/2021 09/26/2020  Imm Admin: Pneumococcal polysaccharide vaccine (PPSV-23)              Overdue - IMM INFLUENZA (1) Overdue since 9/1/2022 09/26/2020  Imm Admin: Influenza Vaccine Adult HD              Postponed - IMM DTaP/Tdap/Td Vaccine (1 - Tdap) Postponed until 6/8/2024      No completion history exists for this topic.              Postponed - IMM ZOSTER VACCINES (1 of 2) Postponed until 8/11/2025      No completion history exists for this topic.              Annual Wellness Visit (Every 366 Days) Next due on 1/28/2024 01/27/2023  Level of Service: [place] ANNUAL WELLNESS VISIT-INCLUDES PPPS SUBSEQUE*    05/25/2022  Subsequent Annual Wellness Visit - Includes PPPS ()    05/25/2022  Visit Dx: Medicare annual wellness visit, subsequent    05/09/2022  Level of Service: [place] ANNUAL WELLNESS VISIT-INCLUDES PPPS SUBSEQUE*              COLORECTAL CANCER SCREENING (COLONOSCOPY - Every 10 Years) Tentatively due on 6/3/2031      06/03/2021  REFERRAL TO GI FOR COLONOSCOPY              IMM HEP B VACCINE (Series Information) Aged Out      No completion history exists for this topic.              IMM MENINGOCOCCAL ACWY VACCINE (Series Information) Aged Out      No completion history exists for this topic.                    Patient Care Team:  Jose Kim M.D. as PCP - General (Geriatric Medicine - Family Medicine)        Social History     Tobacco Use    Smoking status: Former     Packs/day: 1.50     Years: 26.00     Pack years: 39.00     Types: Cigarettes     Start date: 1971     Quit  date:      Years since quittin.1    Smokeless tobacco: Never   Vaping Use    Vaping Use: Never used   Substance Use Topics    Alcohol use: Yes     Comment: ocaccionaly    Drug use: Yes     Types: Marijuana     Comment: daily     Family History   Problem Relation Age of Onset    Cancer Mother         lung    Cancer Father         brain     He  has a past medical history of Obesity (BMI 30-39.9) (2021).   Past Surgical History:   Procedure Laterality Date    APPENDECTOMY         Exam:   There were no vitals taken for this visit. There is no height or weight on file to calculate BMI.    Hearing excellent.    Dentition good  Alert, oriented in no acute distress.  Eye contact is good, speech goal directed, affect calm    Assessment and Plan. The following treatment and monitoring plan is recommended:      67 y.o. male     1. Dyslipidemia  Last lipid panel was:  Component Ref Range & Units 8 mo ago 2 yr ago   Cholesterol,Tot 100 - 199 mg/dL 200 High   225 High     Triglycerides 0 - 149 mg/dL 174 High   226 High     HDL >=40 mg/dL 39 Abnormal   41    LDL <100 mg/dL 126 High   139 High     Resulting Agency  [facility]      Patient was started on rosuvastatin last visit in 2022 but he did not take it.  Wanted to try diet control.  We will repeat lipid panel.  However patient has no history of diabetes, CAD, or stroke.    - Lipid Profile; Future  - TSH; Future  - Subsequent Annual Wellness Visit - Includes PPPS ()    2. IGT (impaired glucose tolerance)  Blood glucose has been slightly above 100, last A1c was 5.8%.  Denies polyuria polydipsia.  Patient is counseled on lifestyle modification  We will continue monitor blood glucose    - Comp Metabolic Panel; Future  - TSH; Future  - Subsequent Annual Wellness Visit - Includes PPPS ()    3. Severe obesity (BMI 35.0-39.9) with comorbidity (HCC)  BMI has been high, most recent weight 2 has 267 pounds, BMI around 38.  Patient stated that he has been trying to lose  weight.  Patient is counseled on lifestyle modification    - TSH; Future  - Subsequent Annual Wellness Visit - Includes PPPS ()    4. Screening for deficiency anemia    - CBC WITH DIFFERENTIAL; Future  - Comp Metabolic Panel; Future  - Subsequent Annual Wellness Visit - Includes PPPS ()    5. Medicare annual wellness visit, subsequent  Preventive measures and chronic medical issues reviewed.    - Subsequent Annual Wellness Visit - Includes PPPS ()          Services suggested: No services needed at this time  Health Care Screening: Age-appropriate preventive services recommended by USPTF and ACIP covered by Medicare were discussed today. Services ordered if indicated and agreed upon by the patient.  Referrals offered: Community-based lifestyle interventions to reduce health risks and promote self-management and wellness, fall prevention, nutrition, physical activity, tobacco-use cessation, weight loss, and mental health services as per orders if indicated.    Discussion today about general wellness and lifestyle habits:    Prevent falls and reduce trip hazards; Cautioned about securing or removing rugs.  Have a working fire alarm and carbon monoxide detector;   Engage in regular physical activity and social activities     Follow-up: No follow-ups on file.

## 2023-03-14 ENCOUNTER — HOSPITAL ENCOUNTER (OUTPATIENT)
Dept: LAB | Facility: MEDICAL CENTER | Age: 68
End: 2023-03-14
Attending: FAMILY MEDICINE
Payer: MEDICARE

## 2023-03-14 DIAGNOSIS — E66.01 SEVERE OBESITY (BMI 35.0-39.9) WITH COMORBIDITY (HCC): ICD-10-CM

## 2023-03-14 DIAGNOSIS — E78.5 DYSLIPIDEMIA: ICD-10-CM

## 2023-03-14 DIAGNOSIS — Z13.0 SCREENING FOR DEFICIENCY ANEMIA: ICD-10-CM

## 2023-03-14 DIAGNOSIS — R73.02 IGT (IMPAIRED GLUCOSE TOLERANCE): ICD-10-CM

## 2023-03-14 LAB
ALBUMIN SERPL BCP-MCNC: 4.3 G/DL (ref 3.2–4.9)
ALBUMIN/GLOB SERPL: 1.5 G/DL
ALP SERPL-CCNC: 79 U/L (ref 30–99)
ALT SERPL-CCNC: 32 U/L (ref 2–50)
ANION GAP SERPL CALC-SCNC: 12 MMOL/L (ref 7–16)
AST SERPL-CCNC: 16 U/L (ref 12–45)
BASOPHILS # BLD AUTO: 0.9 % (ref 0–1.8)
BASOPHILS # BLD: 0.08 K/UL (ref 0–0.12)
BILIRUB CONJ SERPL-MCNC: <0.2 MG/DL (ref 0.1–0.5)
BILIRUB INDIRECT SERPL-MCNC: NORMAL MG/DL (ref 0–1)
BILIRUB SERPL-MCNC: 0.6 MG/DL (ref 0.1–1.5)
BUN SERPL-MCNC: 19 MG/DL (ref 8–22)
CALCIUM ALBUM COR SERPL-MCNC: 9 MG/DL (ref 8.5–10.5)
CALCIUM SERPL-MCNC: 9.2 MG/DL (ref 8.5–10.5)
CHLORIDE SERPL-SCNC: 102 MMOL/L (ref 96–112)
CHOLEST SERPL-MCNC: 203 MG/DL (ref 100–199)
CO2 SERPL-SCNC: 23 MMOL/L (ref 20–33)
CREAT SERPL-MCNC: 0.94 MG/DL (ref 0.5–1.4)
EOSINOPHIL # BLD AUTO: 0.21 K/UL (ref 0–0.51)
EOSINOPHIL NFR BLD: 2.5 % (ref 0–6.9)
ERYTHROCYTE [DISTWIDTH] IN BLOOD BY AUTOMATED COUNT: 47.9 FL (ref 35.9–50)
GFR SERPLBLD CREATININE-BSD FMLA CKD-EPI: 88 ML/MIN/1.73 M 2
GLOBULIN SER CALC-MCNC: 2.9 G/DL (ref 1.9–3.5)
GLUCOSE SERPL-MCNC: 108 MG/DL (ref 65–99)
HCT VFR BLD AUTO: 49.5 % (ref 42–52)
HDLC SERPL-MCNC: 39 MG/DL
HGB BLD-MCNC: 16.5 G/DL (ref 14–18)
IMM GRANULOCYTES # BLD AUTO: 0.03 K/UL (ref 0–0.11)
IMM GRANULOCYTES NFR BLD AUTO: 0.4 % (ref 0–0.9)
LDLC SERPL CALC-MCNC: 136 MG/DL
LYMPHOCYTES # BLD AUTO: 1.79 K/UL (ref 1–4.8)
LYMPHOCYTES NFR BLD: 21.1 % (ref 22–41)
MCH RBC QN AUTO: 29.9 PG (ref 27–33)
MCHC RBC AUTO-ENTMCNC: 33.3 G/DL (ref 33.7–35.3)
MCV RBC AUTO: 89.8 FL (ref 81.4–97.8)
MONOCYTES # BLD AUTO: 0.71 K/UL (ref 0–0.85)
MONOCYTES NFR BLD AUTO: 8.4 % (ref 0–13.4)
NEUTROPHILS # BLD AUTO: 5.65 K/UL (ref 1.82–7.42)
NEUTROPHILS NFR BLD: 66.7 % (ref 44–72)
NRBC # BLD AUTO: 0 K/UL
NRBC BLD-RTO: 0 /100 WBC
PLATELET # BLD AUTO: 280 K/UL (ref 164–446)
PMV BLD AUTO: 9.3 FL (ref 9–12.9)
POTASSIUM SERPL-SCNC: 4.3 MMOL/L (ref 3.6–5.5)
PROT SERPL-MCNC: 7.2 G/DL (ref 6–8.2)
RBC # BLD AUTO: 5.51 M/UL (ref 4.7–6.1)
SODIUM SERPL-SCNC: 137 MMOL/L (ref 135–145)
TRIGL SERPL-MCNC: 139 MG/DL (ref 0–149)
TSH SERPL DL<=0.005 MIU/L-ACNC: 2.51 UIU/ML (ref 0.38–5.33)
WBC # BLD AUTO: 8.5 K/UL (ref 4.8–10.8)

## 2023-03-14 PROCEDURE — 36415 COLL VENOUS BLD VENIPUNCTURE: CPT

## 2023-03-14 PROCEDURE — 80053 COMPREHEN METABOLIC PANEL: CPT

## 2023-03-14 PROCEDURE — 84443 ASSAY THYROID STIM HORMONE: CPT

## 2023-03-14 PROCEDURE — 80061 LIPID PANEL: CPT

## 2023-03-14 PROCEDURE — 82248 BILIRUBIN DIRECT: CPT

## 2023-03-14 PROCEDURE — 85025 COMPLETE CBC W/AUTO DIFF WBC: CPT

## 2023-03-23 ENCOUNTER — OFFICE VISIT (OUTPATIENT)
Dept: MEDICAL GROUP | Facility: MEDICAL CENTER | Age: 68
End: 2023-03-23
Payer: MEDICARE

## 2023-03-23 VITALS
BODY MASS INDEX: 39.35 KG/M2 | WEIGHT: 265.65 LBS | HEART RATE: 94 BPM | TEMPERATURE: 97.3 F | OXYGEN SATURATION: 94 % | HEIGHT: 69 IN | SYSTOLIC BLOOD PRESSURE: 112 MMHG | DIASTOLIC BLOOD PRESSURE: 92 MMHG

## 2023-03-23 DIAGNOSIS — R73.02 IGT (IMPAIRED GLUCOSE TOLERANCE): ICD-10-CM

## 2023-03-23 DIAGNOSIS — E66.01 SEVERE OBESITY (BMI 35.0-39.9) WITH COMORBIDITY (HCC): ICD-10-CM

## 2023-03-23 DIAGNOSIS — E78.5 DYSLIPIDEMIA: ICD-10-CM

## 2023-03-23 PROCEDURE — 99214 OFFICE O/P EST MOD 30 MIN: CPT | Performed by: FAMILY MEDICINE

## 2023-03-23 ASSESSMENT — FIBROSIS 4 INDEX: FIB4 SCORE: 0.68

## 2023-03-23 NOTE — PROGRESS NOTES
CC: Dyslipidemia, impaired glucose tolerance, severe obesity    HPI:   Hima presents today to discuss the following:    Dyslipidemia  Last lipid panel showed   Cholesterol,Tot 100 - 199 mg/dL 203 High   200 High   225 High     Triglycerides 0 - 149 mg/dL 139  174 High   226 High     HDL >=40 mg/dL 39 Abnormal   39 Abnormal   41    LDL <100 mg/dL 136 High   126 High   139 High      However patient has no history of diabetes but he is prediabetic, no history of CAD, or stroke      IGT (impaired glucose tolerance)  Blood glucose has been slightly above 100.  However no history of diabetes.  Denies polyuria and polydipsia.  Currently not on medication    Severe obesity (BMI 35.0-39.9) with comorbidity ([facility])  BMI is 39.2. The medical rationale for weight loss in obese individuals is that obesity is associated with a significant increase in mortality, and many health risks including type 2 diabetes mellitus, hypertension, dyslipidemia, and coronary heart disease. The benefits of weight loss include a reduction in the rate of progression from impaired glucose tolerance to diabetes, blood pressure in hypertensive patients, and lipid levels in higher risk patients. Other noncardiac benefits of weight loss include reductions in urinary incontinence, sleep apnea, and depression, and improvements in quality of life, physical functioning, and mobility.Recommend lifestyle modification: exercise 30 minutes per day 5 days per week. Recommend also portion control.          Patient Active Problem List    Diagnosis Date Noted    Disorder of arteries and arterioles, unspecified ([facility]) 01/27/2023    IGT (impaired glucose tolerance) 06/13/2022    Marijuana use 05/09/2022    Severe obesity (BMI 35.0-39.9) with comorbidity ([facility]) 05/09/2022    Dyslipidemia 02/05/2021       Current Outpatient Medications   Medication Sig Dispense Refill    ibuprofen (MOTRIN) 200 MG Tab Take 200 mg by mouth every 8 hours as needed.       No current  "facility-administered medications for this visit.         Allergies as of 03/23/2023    (No Known Allergies)        ROS: Denies any chest pain, Shortness of breath, Changes bowel or bladder, Lower extremity edema.    Physical Exam:  BP (!) 112/92 (BP Location: Left arm, Patient Position: Sitting, BP Cuff Size: Adult)   Pulse 94   Temp 36.3 °C (97.3 °F) (Temporal)   Ht 1.753 m (5' 9\")   Wt 121 kg (265 lb 10.5 oz)   SpO2 94%   BMI 39.23 kg/m²   Gen.: Well-developed, well-nourished, no apparent distress,pleasant and cooperative with the examination  Skin:  Warm and dry with good turgor. No rashes or suspicious lesions in visible areas  HEENT:Sinuses nontender with palpation, TMs clear, nares patent with pink mucosa and clear rhinorrhea,no septal deviation ,polyps or lesions. lips without lesions, oropharynx clear.  Neck: Trachea midline,no masses or adenopathy. No JVD.  Cor: Regular rate and rhythm without murmur, gallop or rub.  Lungs: Respirations unlabored.Clear to auscultation with equal breath sounds bilaterally. No wheezes, rhonchi.  Extremities: No cyanosis, clubbing or edema.          Assessment and Plan.   67 y.o. male     1. Dyslipidemia  Last lipid panel showed high total cholesterol, and LDL, and low HDL.  However patient has no history of diabetes but he is prediabetic, no history of CAD, or stroke  Patient is counseled on lifestyle modification    2. IGT (impaired glucose tolerance)  Blood glucose has been slightly above 100.  However no history of diabetes  Patient is counseled on lifestyle medication  We will continue monitor blood glucose    3. Severe obesity (BMI 35.0-39.9) with comorbidity (HCC)  BMI is 39.2  Patient is counseled on lifestyle modification        "

## 2024-01-09 ENCOUNTER — OFFICE VISIT (OUTPATIENT)
Dept: MEDICAL GROUP | Facility: PHYSICIAN GROUP | Age: 69
End: 2024-01-09
Attending: FAMILY MEDICINE
Payer: MEDICARE

## 2024-01-09 VITALS
BODY MASS INDEX: 37.34 KG/M2 | DIASTOLIC BLOOD PRESSURE: 80 MMHG | SYSTOLIC BLOOD PRESSURE: 124 MMHG | HEIGHT: 71 IN | WEIGHT: 266.7 LBS

## 2024-01-09 DIAGNOSIS — I77.9 DISORDER OF ARTERIES AND ARTERIOLES, UNSPECIFIED (HCC): ICD-10-CM

## 2024-01-09 DIAGNOSIS — Z23 NEED FOR VACCINATION: ICD-10-CM

## 2024-01-09 DIAGNOSIS — E78.5 DYSLIPIDEMIA: ICD-10-CM

## 2024-01-09 DIAGNOSIS — H61.21 IMPACTED CERUMEN OF RIGHT EAR: ICD-10-CM

## 2024-01-09 DIAGNOSIS — R73.02 IGT (IMPAIRED GLUCOSE TOLERANCE): ICD-10-CM

## 2024-01-09 DIAGNOSIS — E66.01 SEVERE OBESITY (BMI 35.0-39.9) WITH COMORBIDITY (HCC): ICD-10-CM

## 2024-01-09 PROCEDURE — 1126F AMNT PAIN NOTED NONE PRSNT: CPT | Performed by: PHYSICIAN ASSISTANT

## 2024-01-09 PROCEDURE — 3079F DIAST BP 80-89 MM HG: CPT | Performed by: PHYSICIAN ASSISTANT

## 2024-01-09 PROCEDURE — G0439 PPPS, SUBSEQ VISIT: HCPCS | Performed by: PHYSICIAN ASSISTANT

## 2024-01-09 PROCEDURE — 3074F SYST BP LT 130 MM HG: CPT | Performed by: PHYSICIAN ASSISTANT

## 2024-01-09 SDOH — ECONOMIC STABILITY: INCOME INSECURITY: HOW HARD IS IT FOR YOU TO PAY FOR THE VERY BASICS LIKE FOOD, HOUSING, MEDICAL CARE, AND HEATING?: NOT HARD AT ALL

## 2024-01-09 SDOH — ECONOMIC STABILITY: FOOD INSECURITY: WITHIN THE PAST 12 MONTHS, YOU WORRIED THAT YOUR FOOD WOULD RUN OUT BEFORE YOU GOT MONEY TO BUY MORE.: NEVER TRUE

## 2024-01-09 SDOH — ECONOMIC STABILITY: FOOD INSECURITY: WITHIN THE PAST 12 MONTHS, THE FOOD YOU BOUGHT JUST DIDN'T LAST AND YOU DIDN'T HAVE MONEY TO GET MORE.: NEVER TRUE

## 2024-01-09 SDOH — ECONOMIC STABILITY: TRANSPORTATION INSECURITY
IN THE PAST 12 MONTHS, HAS THE LACK OF TRANSPORTATION KEPT YOU FROM MEDICAL APPOINTMENTS OR FROM GETTING MEDICATIONS?: NO

## 2024-01-09 SDOH — ECONOMIC STABILITY: TRANSPORTATION INSECURITY
IN THE PAST 12 MONTHS, HAS LACK OF TRANSPORTATION KEPT YOU FROM MEETINGS, WORK, OR FROM GETTING THINGS NEEDED FOR DAILY LIVING?: NO

## 2024-01-09 ASSESSMENT — PATIENT HEALTH QUESTIONNAIRE - PHQ9: CLINICAL INTERPRETATION OF PHQ2 SCORE: 0

## 2024-01-09 ASSESSMENT — ENCOUNTER SYMPTOMS: GENERAL WELL-BEING: GOOD

## 2024-01-09 ASSESSMENT — FIBROSIS 4 INDEX: FIB4 SCORE: 0.69

## 2024-01-09 ASSESSMENT — PAIN SCALES - GENERAL: PAINLEVEL: NO PAIN

## 2024-01-09 ASSESSMENT — ACTIVITIES OF DAILY LIVING (ADL): BATHING_REQUIRES_ASSISTANCE: 0

## 2024-01-10 NOTE — ASSESSMENT & PLAN NOTE
Chronic, stable condition. Pt reports limited exercise presently as his energy is going towards caring for his wife. He has not made any dietary modification as well because his wife struggles with low weight so he prepares high protein, high calorie foods which he ultimately also consumes. He understands his weight may be contributing to his hyperlipidemia and pre-diabetes syndrome as well as the risk that comes with this. He will continue to consider implementing dietary and lifestyle modification. Follow up with PCP for continued management.

## 2024-01-10 NOTE — ASSESSMENT & PLAN NOTE
Pt complains of new decreased hearing of his right ear. He has historically struggled with excessive cerumen. I did appreciate a considerable amount within the ear canal. Advise f/up with PCP or Urgent Care for further evaluation and management. If hearing does not improve, pursue audiological evaluation.

## 2024-01-10 NOTE — ASSESSMENT & PLAN NOTE
Chronic, uncontrolled issue. Last lipid panel from March 2023 with LDL of 136 with a total cholesterol of 203. He was advised dietary/lifestyle modifications by his PCP last year. He has not since implemented these changes nor followed up with his PCP for continued monitoring. Continue to advise Mediterranean diet and increasing daily physical activity. Advise PCP follow up for continued management.     Latest Reference Range & Units 03/14/23 09:07   Cholesterol,Tot 100 - 199 mg/dL 203 (H)   Triglycerides 0 - 149 mg/dL 139   HDL >=40 mg/dL 39 !   LDL <100 mg/dL 136 (H)   (H): Data is abnormally high  !: Data is abnormal

## 2024-01-10 NOTE — ASSESSMENT & PLAN NOTE
Chronic issue with no recent labs to review since March 2022. Last A1c from 3/2023 improved from prior at 5.8. Educated on lifestyle/dietary changes to further improve blood glucose control. Follow up with PCP for continued monitoring and management.

## 2024-01-10 NOTE — PROGRESS NOTES
Comprehensive Health Assessment Program     Hima Barrios is a 68 y.o. here for his comprehensive health assessment.    Patient Active Problem List    Diagnosis Date Noted    Impacted cerumen of right ear 2024    Disorder of arteries and arterioles, unspecified (HCC) 2023    IGT (impaired glucose tolerance) 2022    Marijuana use 2022    Severe obesity (BMI 35.0-39.9) with comorbidity (HCC) 2022    Dyslipidemia 2021       Current Outpatient Medications   Medication Sig Dispense Refill    Zoster Vac Recomb Adjuvanted (SHINGRIX) 50 MCG/0.5ML Recon Susp Inject 0.5 mL into the shoulder, thigh, or buttocks one time for 1 dose. 1 Each 0    ibuprofen (MOTRIN) 200 MG Tab Take 200 mg by mouth every 8 hours as needed.       No current facility-administered medications for this visit.          Current supplements as per medication list.     Allergies:   Patient has no known allergies.  Social History     Tobacco Use    Smoking status: Former     Current packs/day: 0.00     Average packs/day: 1.5 packs/day for 26.0 years (39.0 ttl pk-yrs)     Types: Cigarettes     Start date:      Quit date:      Years since quittin.0    Smokeless tobacco: Never   Vaping Use    Vaping Use: Never used   Substance Use Topics    Alcohol use: Not Currently    Drug use: Yes     Types: Marijuana     Comment: daily     Family History   Problem Relation Age of Onset    Cancer Mother         lung    Cancer Father         sourav Rabago  has a past medical history of Obesity (BMI 30-39.9) (2021).   Past Surgical History:   Procedure Laterality Date    APPENDECTOMY         Screening:  In the last six months have you experienced any leakage of urine? No    Depression Screening  Little interest or pleasure in doing things?  0 - not at all  Feeling down, depressed , or hopeless? 0 - not at all  Patient Health Questionnaire Score: 0     If depressive symptoms identified deferred to follow up  visit unless specifically addressed in assessment and plan.    Interpretation of PHQ-9 Total Score   Score Severity   1-4 No Depression   5-9 Mild Depression   10-14 Moderate Depression   15-19 Moderately Severe Depression   20-27 Severe Depression    Screening for Cognitive Impairment  Do you or any of your friends or family members have any concern about your memory? No  Three Minute Recall (Banana, Sunrise, Chair) 3/3    Guillermo clock face with all 12 numbers and set the hands to show 20 past 8.  Yes 5/5  Cognitive concerns identified deferred for follow up unless specifically addressed in assessment and plan.    Fall Risk Assessment  Has the patient had two or more falls in the last year or any fall with injury in the last year?  No    Safety Assessment  Do you always wear your seatbelt?  Yes  Any changes to home needed to function safely? No  Difficulty hearing.  Yes  Patient counseled about all safety risks that were identified.    Functional Assessment ADLs  Are there any barriers preventing you from cooking for yourself or meeting nutritional needs?  No.    Are there any barriers preventing you from driving safely or obtaining transportation?  No.    Are there any barriers preventing you from using a telephone or calling for help?  No    Are there any barriers preventing you from shopping?  No.    Are there any barriers preventing you from taking care of your own finances?  No    Are there any barriers preventing you from managing your medications?  No    Are there any barriers preventing you from showering, bathing or dressing yourself? No    Are there any barriers preventing you from doing housework or laundry? No  Are there any barriers preventing you from using the toilet?No  Are you currently engaging in any exercise or physical activity?  Yes. Walks dog a couple block a day     Self-Assessment of Health  What is your perception of your health? Good  Do you sleep more than six hours a night? Yes  In the  past 7 days, how much did pain keep you from doing your normal work? None  Do you spend quality time with family or friends (virtually or in person)? Yes  Do you usually eat a heart healthy diet that constists of a variety of fruits, vegetables, whole grains and fiber? Yes  Do you eat foods high in fat and/or Fast Food more than three times per week? No    Advance Care Planning  Do you have an Advance Directive, Living Will, Durable Power of , or POLST? No                 Health Maintenance Summary            Overdue - Hepatitis C Screening (Once) Overdue - never done      No completion history exists for this topic.              Ordered - Pneumococcal Vaccine: 65+ Years (2 - PCV) Ordered on 1/9/2024 09/26/2020  Imm Admin: Pneumococcal polysaccharide vaccine (PPSV-23)              Postponed - IMM DTaP/Tdap/Td Vaccine (1 - Tdap) Postponed until 6/8/2024      No completion history exists for this topic.              Postponed - COVID-19 Vaccine (4 - 2023-24 season) Postponed until 10/7/2024      10/07/2023  Imm Admin: Covid-19 Mrna (Spikevax) Moderna 12+ Years    03/22/2021  Imm Admin: PFIZER PURPLE CAP SARS-COV-2 VACCINATION (12+)    03/01/2021  Imm Admin: PFIZER PURPLE CAP SARS-COV-2 VACCINATION (12+)              Ordered - Zoster (Shingles) Vaccines (1 of 2) Ordered on 1/9/2024      No completion history exists for this topic.              Annual Wellness Visit (Every 366 Days) Next due on 1/9/2025 01/09/2024  Level of Service: ANNUAL WELLNESS VISIT-INCLUDES PPPS SUBSEQUE*    03/01/2023  Visit Dx: Medicare annual wellness visit, subsequent    03/01/2023  Subsequent Annual Wellness Visit - Includes PPPS ()    01/27/2023  Level of Service: [place] ANNUAL WELLNESS VISIT-INCLUDES PPPS SUBSEQUE*    05/25/2022  Subsequent Annual Wellness Visit - Includes PPPS ()    Only the first 5 history entries have been loaded, but more history exists.              Colorectal Cancer Screening (Colonoscopy -  "Every 5 Years) Next due on 6/3/2026      06/03/2021  REFERRAL TO GI FOR COLONOSCOPY              Influenza Vaccine (Series Information) Completed      10/07/2023  Imm Admin: Influenza, Unspecified - HISTORICAL DATA    09/26/2020  Imm Admin: Influenza Vaccine Adult HD              Hepatitis A Vaccine (Hep A) (Series Information) Aged Out      No completion history exists for this topic.              Hepatitis B Vaccine (Hep B) (Series Information) Aged Out      No completion history exists for this topic.              HPV Vaccines (Series Information) Aged Out      No completion history exists for this topic.              Polio Vaccine (Inactivated Polio) (Series Information) Aged Out      No completion history exists for this topic.              Meningococcal Immunization (Series Information) Aged Out      No completion history exists for this topic.              Discontinued - Abdominal Aortic Aneurysm (AAA) Screening  Discontinued      No completion history exists for this topic.                    Patient Care Team:  Jose Kim M.D. as PCP - General (Geriatric Medicine - Family Medicine)      Financial Resource Strain: Low Risk  (1/9/2024)    Overall Financial Resource Strain (CARDIA)     Difficulty of Paying Living Expenses: Not hard at all      Transportation Needs: No Transportation Needs (1/9/2024)    PRAPARE - Transportation     Lack of Transportation (Medical): No     Lack of Transportation (Non-Medical): No      Food Insecurity: No Food Insecurity (1/9/2024)    Hunger Vital Sign     Worried About Running Out of Food in the Last Year: Never true     Ran Out of Food in the Last Year: Never true        Encounter Vitals  Blood Pressure : 124/80  Weight: 121 kg (266 lb 11.2 oz)  Height: 180.3 cm (5' 11\")  BMI (Calculated): 37.2  Pain Score: No pain     Alert, oriented in no acute distress.  Eye contact is good, speech goal directed, affect calm.    Assessment and Plan. The following treatment and " monitoring plan is recommended:  Dyslipidemia  Chronic, uncontrolled issue. Last lipid panel from March 2023 with LDL of 136 with a total cholesterol of 203. He was advised dietary/lifestyle modifications by his PCP last year. He has not since implemented these changes nor followed up with his PCP for continued monitoring. Continue to advise Mediterranean diet and increasing daily physical activity. Advise PCP follow up for continued management.     Latest Reference Range & Units 03/14/23 09:07   Cholesterol,Tot 100 - 199 mg/dL 203 (H)   Triglycerides 0 - 149 mg/dL 139   HDL >=40 mg/dL 39 !   LDL <100 mg/dL 136 (H)   (H): Data is abnormally high  !: Data is abnormal    Severe obesity (BMI 35.0-39.9) with comorbidity (HCC)  Chronic, stable condition. Pt reports limited exercise presently as his energy is going towards caring for his wife. He has not made any dietary modification as well because his wife struggles with low weight so he prepares high protein, high calorie foods which he ultimately also consumes. He understands his weight may be contributing to his hyperlipidemia and pre-diabetes syndrome as well as the risk that comes with this. He will continue to consider implementing dietary and lifestyle modification. Follow up with PCP for continued management.     Disorder of arteries and arterioles, unspecified (HCC)  Chronic issue, diagnosed via two subsequent annual [employer] screenings. Reviewed symptoms of peripheral arterial disease and pt denies these symptoms. Pt to monitor for these symptoms and discuss further with PCP whether or not further diagnostics should be pursued a this time.    IGT (impaired glucose tolerance)  Chronic issue with no recent labs to review since March 2022. Last A1c from 3/2023 improved from prior at 5.8. Educated on lifestyle/dietary changes to further improve blood glucose control. Follow up with PCP for continued monitoring and management.    Impacted cerumen of right ear  Pt  complains of new decreased hearing of his right ear. He has historically struggled with excessive cerumen. I did appreciate a considerable amount within the ear canal. Advise f/up with PCP or Urgent Care for further evaluation and management. If hearing does not improve, pursue audiological evaluation.    Need for vaccination  Pt is due for Pneumococcal Conjugate vaccine as well as the Shringrix vaccine. Pt will discuss PCV further with his PCP. He was provided prescription for Shingrix.    Services suggested: No services needed at this time  Health Care Screening: Age-appropriate preventive services recommended by USPTF and ACIP covered by Medicare were discussed today. Services ordered if indicated and agreed upon by the patient.  Referrals offered: Community-based lifestyle interventions to reduce health risks and promote self-management and wellness, fall prevention, nutrition, physical activity, tobacco-use cessation, weight loss, and mental health services as per orders if indicated.    Discussion today about general wellness and lifestyle habits:    Prevent falls and reduce trip hazards; Cautioned about securing or removing rugs.  Have a working fire alarm and carbon monoxide detector.  Engage in regular physical activity and social activities.    Follow-up: Return for follow up visit with PCP as previously scheduled.

## 2024-01-10 NOTE — ASSESSMENT & PLAN NOTE
Chronic issue, diagnosed via two subsequent annual [employer] screenings. Reviewed symptoms of peripheral arterial disease and pt denies these symptoms. Pt to monitor for these symptoms and discuss further with PCP whether or not further diagnostics should be pursued a this time.

## 2024-01-15 SDOH — ECONOMIC STABILITY: HOUSING INSECURITY: IN THE LAST 12 MONTHS, HOW MANY PLACES HAVE YOU LIVED?: 1

## 2024-01-15 SDOH — ECONOMIC STABILITY: FOOD INSECURITY: WITHIN THE PAST 12 MONTHS, YOU WORRIED THAT YOUR FOOD WOULD RUN OUT BEFORE YOU GOT MONEY TO BUY MORE.: SOMETIMES TRUE

## 2024-01-15 SDOH — HEALTH STABILITY: PHYSICAL HEALTH: ON AVERAGE, HOW MANY DAYS PER WEEK DO YOU ENGAGE IN MODERATE TO STRENUOUS EXERCISE (LIKE A BRISK WALK)?: 1 DAY

## 2024-01-15 SDOH — ECONOMIC STABILITY: HOUSING INSECURITY
IN THE LAST 12 MONTHS, WAS THERE A TIME WHEN YOU DID NOT HAVE A STEADY PLACE TO SLEEP OR SLEPT IN A SHELTER (INCLUDING NOW)?: NO

## 2024-01-15 SDOH — HEALTH STABILITY: MENTAL HEALTH
STRESS IS WHEN SOMEONE FEELS TENSE, NERVOUS, ANXIOUS, OR CAN'T SLEEP AT NIGHT BECAUSE THEIR MIND IS TROUBLED. HOW STRESSED ARE YOU?: NOT AT ALL

## 2024-01-15 SDOH — HEALTH STABILITY: PHYSICAL HEALTH: ON AVERAGE, HOW MANY MINUTES DO YOU ENGAGE IN EXERCISE AT THIS LEVEL?: 20 MIN

## 2024-01-15 SDOH — ECONOMIC STABILITY: INCOME INSECURITY: HOW HARD IS IT FOR YOU TO PAY FOR THE VERY BASICS LIKE FOOD, HOUSING, MEDICAL CARE, AND HEATING?: SOMEWHAT HARD

## 2024-01-15 SDOH — ECONOMIC STABILITY: FOOD INSECURITY: WITHIN THE PAST 12 MONTHS, THE FOOD YOU BOUGHT JUST DIDN'T LAST AND YOU DIDN'T HAVE MONEY TO GET MORE.: NEVER TRUE

## 2024-01-15 SDOH — ECONOMIC STABILITY: INCOME INSECURITY: IN THE LAST 12 MONTHS, WAS THERE A TIME WHEN YOU WERE NOT ABLE TO PAY THE MORTGAGE OR RENT ON TIME?: NO

## 2024-01-15 SDOH — ECONOMIC STABILITY: TRANSPORTATION INSECURITY
IN THE PAST 12 MONTHS, HAS LACK OF RELIABLE TRANSPORTATION KEPT YOU FROM MEDICAL APPOINTMENTS, MEETINGS, WORK OR FROM GETTING THINGS NEEDED FOR DAILY LIVING?: NO

## 2024-01-15 ASSESSMENT — SOCIAL DETERMINANTS OF HEALTH (SDOH)
HOW OFTEN DO YOU ATTEND CHURCH OR RELIGIOUS SERVICES?: NEVER
HOW OFTEN DO YOU GET TOGETHER WITH FRIENDS OR RELATIVES?: TWICE A WEEK
HOW OFTEN DO YOU GET TOGETHER WITH FRIENDS OR RELATIVES?: TWICE A WEEK
HOW OFTEN DO YOU ATTEND CHURCH OR RELIGIOUS SERVICES?: NEVER
HOW OFTEN DO YOU ATTENT MEETINGS OF THE CLUB OR ORGANIZATION YOU BELONG TO?: MORE THAN 4 TIMES PER YEAR
IN A TYPICAL WEEK, HOW MANY TIMES DO YOU TALK ON THE PHONE WITH FAMILY, FRIENDS, OR NEIGHBORS?: MORE THAN THREE TIMES A WEEK
HOW OFTEN DO YOU ATTENT MEETINGS OF THE CLUB OR ORGANIZATION YOU BELONG TO?: MORE THAN 4 TIMES PER YEAR
HOW HARD IS IT FOR YOU TO PAY FOR THE VERY BASICS LIKE FOOD, HOUSING, MEDICAL CARE, AND HEATING?: SOMEWHAT HARD
IN A TYPICAL WEEK, HOW MANY TIMES DO YOU TALK ON THE PHONE WITH FAMILY, FRIENDS, OR NEIGHBORS?: MORE THAN THREE TIMES A WEEK
DO YOU BELONG TO ANY CLUBS OR ORGANIZATIONS SUCH AS CHURCH GROUPS UNIONS, FRATERNAL OR ATHLETIC GROUPS, OR SCHOOL GROUPS?: YES
HOW MANY DRINKS CONTAINING ALCOHOL DO YOU HAVE ON A TYPICAL DAY WHEN YOU ARE DRINKING: 1 OR 2
HOW OFTEN DO YOU HAVE A DRINK CONTAINING ALCOHOL: MONTHLY OR LESS
DO YOU BELONG TO ANY CLUBS OR ORGANIZATIONS SUCH AS CHURCH GROUPS UNIONS, FRATERNAL OR ATHLETIC GROUPS, OR SCHOOL GROUPS?: YES
WITHIN THE PAST 12 MONTHS, YOU WORRIED THAT YOUR FOOD WOULD RUN OUT BEFORE YOU GOT THE MONEY TO BUY MORE: SOMETIMES TRUE
HOW OFTEN DO YOU HAVE SIX OR MORE DRINKS ON ONE OCCASION: NEVER

## 2024-01-15 ASSESSMENT — LIFESTYLE VARIABLES
HOW MANY STANDARD DRINKS CONTAINING ALCOHOL DO YOU HAVE ON A TYPICAL DAY: 1 OR 2
HOW OFTEN DO YOU HAVE SIX OR MORE DRINKS ON ONE OCCASION: NEVER
SKIP TO QUESTIONS 9-10: 1
HOW OFTEN DO YOU HAVE A DRINK CONTAINING ALCOHOL: MONTHLY OR LESS
AUDIT-C TOTAL SCORE: 1

## 2024-01-17 ENCOUNTER — OFFICE VISIT (OUTPATIENT)
Dept: MEDICAL GROUP | Facility: MEDICAL CENTER | Age: 69
End: 2024-01-17
Payer: MEDICARE

## 2024-01-17 VITALS
BODY MASS INDEX: 36.99 KG/M2 | DIASTOLIC BLOOD PRESSURE: 82 MMHG | HEART RATE: 98 BPM | OXYGEN SATURATION: 97 % | HEIGHT: 71 IN | TEMPERATURE: 97.1 F | SYSTOLIC BLOOD PRESSURE: 126 MMHG | WEIGHT: 264.2 LBS | RESPIRATION RATE: 16 BRPM

## 2024-01-17 DIAGNOSIS — Z00.00 MEDICARE ANNUAL WELLNESS VISIT, SUBSEQUENT: ICD-10-CM

## 2024-01-17 DIAGNOSIS — H61.23 EXCESSIVE CERUMEN IN BOTH EAR CANALS: ICD-10-CM

## 2024-01-17 DIAGNOSIS — E66.01 SEVERE OBESITY (BMI 35.0-39.9) WITH COMORBIDITY (HCC): ICD-10-CM

## 2024-01-17 DIAGNOSIS — E78.5 DYSLIPIDEMIA: ICD-10-CM

## 2024-01-17 DIAGNOSIS — H91.91 HEARING PROBLEM OF RIGHT EAR: ICD-10-CM

## 2024-01-17 DIAGNOSIS — R73.02 IGT (IMPAIRED GLUCOSE TOLERANCE): ICD-10-CM

## 2024-01-17 DIAGNOSIS — Z12.5 PROSTATE CANCER SCREENING: ICD-10-CM

## 2024-01-17 PROBLEM — I77.9 DISORDER OF ARTERIES AND ARTERIOLES, UNSPECIFIED (HCC): Status: RESOLVED | Noted: 2023-01-27 | Resolved: 2024-01-17

## 2024-01-17 PROCEDURE — 3074F SYST BP LT 130 MM HG: CPT | Performed by: FAMILY MEDICINE

## 2024-01-17 PROCEDURE — G0439 PPPS, SUBSEQ VISIT: HCPCS | Performed by: FAMILY MEDICINE

## 2024-01-17 PROCEDURE — 3079F DIAST BP 80-89 MM HG: CPT | Performed by: FAMILY MEDICINE

## 2024-01-17 ASSESSMENT — ACTIVITIES OF DAILY LIVING (ADL): BATHING_REQUIRES_ASSISTANCE: 0

## 2024-01-17 ASSESSMENT — FIBROSIS 4 INDEX: FIB4 SCORE: 0.69

## 2024-01-17 ASSESSMENT — ENCOUNTER SYMPTOMS: GENERAL WELL-BEING: EXCELLENT

## 2024-01-17 ASSESSMENT — PATIENT HEALTH QUESTIONNAIRE - PHQ9: CLINICAL INTERPRETATION OF PHQ2 SCORE: 0

## 2024-01-17 NOTE — PROGRESS NOTES
Chief Complaint   Patient presents with    Annual Exam       HPI:  Hima Barrios is a 68 y.o. here for Medicare Annual Wellness Visit     Patient Active Problem List    Diagnosis Date Noted    Impacted cerumen of right ear 01/09/2024    Disorder of arteries and arterioles, unspecified (HCC) 01/27/2023    IGT (impaired glucose tolerance) 06/13/2022    Marijuana use 05/09/2022    Severe obesity (BMI 35.0-39.9) with comorbidity (HCC) 05/09/2022    Dyslipidemia 02/05/2021       Current Outpatient Medications   Medication Sig Dispense Refill    ibuprofen (MOTRIN) 200 MG Tab Take 200 mg by mouth every 8 hours as needed.       No current facility-administered medications for this visit.          Current supplements as per medication list.     Allergies: Patient has no known allergies.    Current social contact/activities:      He  reports that he quit smoking about 27 years ago. His smoking use included cigarettes. He started smoking about 53 years ago. He has a 39.0 pack-year smoking history. He has never used smokeless tobacco. He reports that he does not currently use alcohol. He reports current drug use. Drug: Marijuana.  Counseling given: Not Answered      ROS:    Gait: Uses no assistive device  Ostomy: No  Other tubes: No  Amputations: No  Chronic oxygen use: No  Last eye exam: n/a  Wears hearing aids: No   : Denies any urinary leakage during the last 6 months    Screening:    Depression Screening  Little interest or pleasure in doing things?  0 - not at all  Feeling down, depressed , or hopeless? 0 - not at all  Patient Health Questionnaire Score: 0     If depressive symptoms identified deferred to follow up visit unless specifically addressed in assessment and plan.    Interpretation of PHQ-9 Total Score   Score Severity   1-4 No Depression   5-9 Mild Depression   10-14 Moderate Depression   15-19 Moderately Severe Depression   20-27 Severe Depression    Screening for Cognitive Impairment  Do you or any of  your friends or family members have any concern about your memory? No  Three Minute Recall (Banana, Sunrise, Chair) 3/3    Guillermo clock face with all 12 numbers and set the hands to show 20 past 8.  Yes    Cognitive concerns identified deferred for follow up unless specifically addressed in assessment and plan.    Fall Risk Assessment  Has the patient had two or more falls in the last year or any fall with injury in the last year?  No    Safety Assessment  Do you always wear your seatbelt?  Yes  Any changes to home needed to function safely? No  Difficulty hearing.  No  Patient counseled about all safety risks that were identified.    Functional Assessment ADLs  Are there any barriers preventing you from cooking for yourself or meeting nutritional needs?  No.    Are there any barriers preventing you from driving safely or obtaining transportation?  No.    Are there any barriers preventing you from using a telephone or calling for help?  No    Are there any barriers preventing you from shopping?  No.    Are there any barriers preventing you from taking care of your own finances?  No    Are there any barriers preventing you from managing your medications?  No    Are there any barriers preventing you from showering, bathing or dressing yourself? No    Are there any barriers preventing you from doing housework or laundry? No  Are there any barriers preventing you from using the toilet?No  Are you currently engaging in any exercise or physical activity?  Yes.      Self-Assessment of Health  What is your perception of your health? Excellent  Do you sleep more than six hours a night? Yes  In the past 7 days, how much did pain keep you from doing your normal work? None  Do you spend quality time with family or friends (virtually or in person)? Yes  Do you usually eat a heart healthy diet that constists of a variety of fruits, vegetables, whole grains and fiber? No  Do you eat foods high in fat and/or Fast Food more than three  times per week? No    Advance Care Planning  Do you have an Advance Directive, Living Will, Durable Power of , or POLST? No                 Health Maintenance Summary            Overdue - Hepatitis C Screening (Once) Overdue - never done      No completion history exists for this topic.              Ordered - Pneumococcal Vaccine: 65+ Years (2 - PCV) Ordered on 1/9/2024 09/26/2020  Imm Admin: Pneumococcal polysaccharide vaccine (PPSV-23)              Postponed - IMM DTaP/Tdap/Td Vaccine (1 - Tdap) Postponed until 6/8/2024      No completion history exists for this topic.              Postponed - Zoster (Shingles) Vaccines (1 of 2) Postponed until 8/11/2025      No completion history exists for this topic.              Annual Wellness Visit (Every 366 Days) Next due on 1/9/2025 01/09/2024  Level of Service: [place] ANNUAL WELLNESS VISIT-INCLUDES PPPS SUBSEQUE*    03/01/2023  Visit Dx: Medicare annual wellness visit, subsequent    03/01/2023  Subsequent Annual Wellness Visit - Includes PPPS ()    01/27/2023  Level of Service: [place] ANNUAL WELLNESS VISIT-INCLUDES PPPS SUBSEQUE*    05/25/2022  Subsequent Annual Wellness Visit - Includes PPPS ()    Only the first 5 history entries have been loaded, but more history exists.              Colorectal Cancer Screening (Colonoscopy - Every 5 Years) Next due on 6/3/2026      06/03/2021  REFERRAL TO GI FOR COLONOSCOPY              Influenza Vaccine (Series Information) Completed      10/07/2023  Imm Admin: Influenza, Unspecified - HISTORICAL DATA    09/26/2020  Imm Admin: Influenza Vaccine Adult HD              COVID-19 Vaccine (Series Information) Completed      10/07/2023  Imm Admin: Covid-19 Mrna (Spikevax) Moderna 12+ Years    03/22/2021  Imm Admin: PFIZER PURPLE CAP SARS-COV-2 VACCINATION (12+)    03/01/2021  Imm Admin: PFIZER PURPLE CAP SARS-COV-2 VACCINATION (12+)              Hepatitis A Vaccine (Hep A) (Series Information) Aged Out      No  completion history exists for this topic.              Hepatitis B Vaccine (Hep B) (Series Information) Aged Out      No completion history exists for this topic.              HPV Vaccines (Series Information) Aged Out      No completion history exists for this topic.              Polio Vaccine (Inactivated Polio) (Series Information) Aged Out      No completion history exists for this topic.              Meningococcal Immunization (Series Information) Aged Out      No completion history exists for this topic.              Discontinued - Abdominal Aortic Aneurysm (AAA) Screening  Discontinued      No completion history exists for this topic.                    Patient Care Team:  Jose Kim M.D. as PCP - General (Geriatric Medicine - Family Medicine)        Social History     Tobacco Use    Smoking status: Former     Current packs/day: 0.00     Average packs/day: 1.5 packs/day for 26.0 years (39.0 ttl pk-yrs)     Types: Cigarettes     Start date:      Quit date:      Years since quittin.0    Smokeless tobacco: Never   Vaping Use    Vaping Use: Never used   Substance Use Topics    Alcohol use: Not Currently    Drug use: Yes     Types: Marijuana     Comment: daily     Family History   Problem Relation Age of Onset    Cancer Mother         lung    Cancer Father         brain     He  has a past medical history of Obesity (BMI 30-39.9) (2021).   Past Surgical History:   Procedure Laterality Date    APPENDECTOMY         Exam:   There were no vitals taken for this visit. There is no height or weight on file to calculate BMI.    Hearing excellent.    Dentition good  Alert, oriented in no acute distress.  Eye contact is good, speech goal directed, affect calm    Right ear: Before irrigation: Excessive earwax, tympanic membrane not visible.  After irrigation: Clean external ear, normal tympanic membrane.  Left ear: Before irrigation:Excessive earwax, tympanic membrane not visible.  After  irrigation: Patient still have few earwax attached to the tympanic membrane.    Assessment and Plan. The following treatment and monitoring plan is recommended:    68 y.o. male     1. Medicare annual wellness visit, subsequent  Preventive measures chronic medical issues reviewed    - Subsequent Annual Wellness Visit - Includes PPPS ()    2. Dyslipidemia  Last lipid panel showed high DL and total cholesterol.  However no history of CAD, stroke, or diabetes.  Patient is counseled on lifestyle modification  We will continue to monitor lipid panel    - Subsequent Annual Wellness Visit - Includes PPPS ()  - TSH; Future  - Lipid Profile; Future    3. IGT (impaired glucose tolerance)  Blood glucose is slightly above 100.  However no history of diabetes.  Denies polyuria and polydipsia.  Will continue monitor blood glucose.    - Subsequent Annual Wellness Visit - Includes PPPS ()  - CBC WITH DIFFERENTIAL; Future  - Comp Metabolic Panel; Future  - HEMOGLOBIN A1C; Future    4. Severe obesity (BMI 35.0-39.9) with comorbidity (HCC)  BMI 36.8  Patient is counseled on lifestyle modification  - Subsequent Annual Wellness Visit - Includes PPPS ()  - CBC WITH DIFFERENTIAL; Future  - Comp Metabolic Panel; Future    5. Excessive cerumen in both ear canals  Irrigation of both the ears performed.  Left ear is clean.  Right ear still has few wax attached to the tympanic membrane   Patient's hearing improved.    -Ear wax removal    6. Prostate cancer screening  Requested screening for prostate cancer with PSA    - PROSTATE SPECIFIC AG SCREENING; Future    7. Hearing problem of right ear  Patient still have a problem hearing after ear irrigation of the right ear.    - Referral to ENT          Services suggested: No services needed at this time  Health Care Screening: Age-appropriate preventive services recommended by USPTF and ACIP covered by Medicare were discussed today. Services ordered if indicated and agreed upon  by the patient.  Referrals offered: Community-based lifestyle interventions to reduce health risks and promote self-management and wellness, fall prevention, nutrition, physical activity, tobacco-use cessation, weight loss, and mental health services as per orders if indicated.    Discussion today about general wellness and lifestyle habits:    Prevent falls and reduce trip hazards; Cautioned about securing or removing rugs.  Have a working fire alarm and carbon monoxide detector;   Engage in regular physical activity and social activities     Follow-up: No follow-ups on file.

## 2024-01-23 ENCOUNTER — HOSPITAL ENCOUNTER (OUTPATIENT)
Dept: LAB | Facility: MEDICAL CENTER | Age: 69
End: 2024-01-23
Attending: FAMILY MEDICINE
Payer: MEDICARE

## 2024-01-23 DIAGNOSIS — R73.02 IGT (IMPAIRED GLUCOSE TOLERANCE): ICD-10-CM

## 2024-01-23 DIAGNOSIS — E66.01 SEVERE OBESITY (BMI 35.0-39.9) WITH COMORBIDITY (HCC): ICD-10-CM

## 2024-01-23 DIAGNOSIS — Z12.5 PROSTATE CANCER SCREENING: ICD-10-CM

## 2024-01-23 DIAGNOSIS — E78.5 DYSLIPIDEMIA: ICD-10-CM

## 2024-01-23 LAB
ALBUMIN SERPL BCP-MCNC: 4.1 G/DL (ref 3.2–4.9)
ALBUMIN/GLOB SERPL: 1.2 G/DL
ALP SERPL-CCNC: 71 U/L (ref 30–99)
ALT SERPL-CCNC: 38 U/L (ref 2–50)
ANION GAP SERPL CALC-SCNC: 13 MMOL/L (ref 7–16)
AST SERPL-CCNC: 24 U/L (ref 12–45)
BASOPHILS # BLD AUTO: 1.1 % (ref 0–1.8)
BASOPHILS # BLD: 0.08 K/UL (ref 0–0.12)
BILIRUB SERPL-MCNC: 0.5 MG/DL (ref 0.1–1.5)
BUN SERPL-MCNC: 19 MG/DL (ref 8–22)
CALCIUM ALBUM COR SERPL-MCNC: 8.9 MG/DL (ref 8.5–10.5)
CALCIUM SERPL-MCNC: 9 MG/DL (ref 8.5–10.5)
CHLORIDE SERPL-SCNC: 102 MMOL/L (ref 96–112)
CHOLEST SERPL-MCNC: 208 MG/DL (ref 100–199)
CO2 SERPL-SCNC: 22 MMOL/L (ref 20–33)
CREAT SERPL-MCNC: 0.95 MG/DL (ref 0.5–1.4)
EOSINOPHIL # BLD AUTO: 0.17 K/UL (ref 0–0.51)
EOSINOPHIL NFR BLD: 2.3 % (ref 0–6.9)
ERYTHROCYTE [DISTWIDTH] IN BLOOD BY AUTOMATED COUNT: 48.1 FL (ref 35.9–50)
EST. AVERAGE GLUCOSE BLD GHB EST-MCNC: 126 MG/DL
GFR SERPLBLD CREATININE-BSD FMLA CKD-EPI: 87 ML/MIN/1.73 M 2
GLOBULIN SER CALC-MCNC: 3.3 G/DL (ref 1.9–3.5)
GLUCOSE SERPL-MCNC: 111 MG/DL (ref 65–99)
HBA1C MFR BLD: 6 % (ref 4–5.6)
HCT VFR BLD AUTO: 48.6 % (ref 42–52)
HDLC SERPL-MCNC: 38 MG/DL
HGB BLD-MCNC: 16.5 G/DL (ref 14–18)
IMM GRANULOCYTES # BLD AUTO: 0.02 K/UL (ref 0–0.11)
IMM GRANULOCYTES NFR BLD AUTO: 0.3 % (ref 0–0.9)
LDLC SERPL CALC-MCNC: 136 MG/DL
LYMPHOCYTES # BLD AUTO: 1.83 K/UL (ref 1–4.8)
LYMPHOCYTES NFR BLD: 24.3 % (ref 22–41)
MCH RBC QN AUTO: 30.6 PG (ref 27–33)
MCHC RBC AUTO-ENTMCNC: 34 G/DL (ref 32.3–36.5)
MCV RBC AUTO: 90.2 FL (ref 81.4–97.8)
MONOCYTES # BLD AUTO: 0.6 K/UL (ref 0–0.85)
MONOCYTES NFR BLD AUTO: 8 % (ref 0–13.4)
NEUTROPHILS # BLD AUTO: 4.83 K/UL (ref 1.82–7.42)
NEUTROPHILS NFR BLD: 64 % (ref 44–72)
NRBC # BLD AUTO: 0 K/UL
NRBC BLD-RTO: 0 /100 WBC (ref 0–0.2)
PLATELET # BLD AUTO: 296 K/UL (ref 164–446)
PMV BLD AUTO: 9.7 FL (ref 9–12.9)
POTASSIUM SERPL-SCNC: 4.3 MMOL/L (ref 3.6–5.5)
PROT SERPL-MCNC: 7.4 G/DL (ref 6–8.2)
PSA SERPL-MCNC: 1.23 NG/ML (ref 0–4)
RBC # BLD AUTO: 5.39 M/UL (ref 4.7–6.1)
SODIUM SERPL-SCNC: 137 MMOL/L (ref 135–145)
TRIGL SERPL-MCNC: 172 MG/DL (ref 0–149)
TSH SERPL DL<=0.005 MIU/L-ACNC: 2.54 UIU/ML (ref 0.38–5.33)
WBC # BLD AUTO: 7.5 K/UL (ref 4.8–10.8)

## 2024-01-23 PROCEDURE — 80061 LIPID PANEL: CPT

## 2024-01-23 PROCEDURE — 84443 ASSAY THYROID STIM HORMONE: CPT

## 2024-01-23 PROCEDURE — 36415 COLL VENOUS BLD VENIPUNCTURE: CPT

## 2024-01-23 PROCEDURE — 84153 ASSAY OF PSA TOTAL: CPT

## 2024-01-23 PROCEDURE — 83036 HEMOGLOBIN GLYCOSYLATED A1C: CPT

## 2024-01-23 PROCEDURE — 80053 COMPREHEN METABOLIC PANEL: CPT

## 2024-01-23 PROCEDURE — 85025 COMPLETE CBC W/AUTO DIFF WBC: CPT

## 2024-04-16 ENCOUNTER — OFFICE VISIT (OUTPATIENT)
Dept: MEDICAL GROUP | Facility: MEDICAL CENTER | Age: 69
End: 2024-04-16
Payer: MEDICARE

## 2024-04-16 VITALS
RESPIRATION RATE: 16 BRPM | DIASTOLIC BLOOD PRESSURE: 80 MMHG | BODY MASS INDEX: 37.01 KG/M2 | HEIGHT: 71 IN | OXYGEN SATURATION: 95 % | TEMPERATURE: 95.5 F | WEIGHT: 264.33 LBS | HEART RATE: 105 BPM | SYSTOLIC BLOOD PRESSURE: 128 MMHG

## 2024-04-16 DIAGNOSIS — R73.02 IGT (IMPAIRED GLUCOSE TOLERANCE): ICD-10-CM

## 2024-04-16 DIAGNOSIS — E78.5 DYSLIPIDEMIA: ICD-10-CM

## 2024-04-16 DIAGNOSIS — E66.01 SEVERE OBESITY (BMI 35.0-39.9) WITH COMORBIDITY (HCC): ICD-10-CM

## 2024-04-16 PROCEDURE — 3079F DIAST BP 80-89 MM HG: CPT | Performed by: FAMILY MEDICINE

## 2024-04-16 PROCEDURE — 3074F SYST BP LT 130 MM HG: CPT | Performed by: FAMILY MEDICINE

## 2024-04-16 PROCEDURE — 99214 OFFICE O/P EST MOD 30 MIN: CPT | Performed by: FAMILY MEDICINE

## 2024-04-16 ASSESSMENT — FIBROSIS 4 INDEX: FIB4 SCORE: 0.89

## 2024-04-16 NOTE — PROGRESS NOTES
CC: Impaired glucose tolerance, hyperlipidemia, severe obesity    HPI:   Hima presents today to discuss the following:    IGT (impaired glucose tolerance)  Most recent A1c was 6.0%, denies polyuria and polydipsia.  No history of diabetes.  Currently not on medication.    Dyslipidemia  Most recent lipid panel showed          Component  Ref Range & Units 2 mo ago  (1/23/24) 1 yr ago  (3/14/23) 1 yr ago  (6/7/22) 3 yr ago  (10/14/20)   Cholesterol,Tot  100 - 199 mg/dL 208 High  203 High  200 High  225 High    Triglycerides  0 - 149 mg/dL 172 High  139 174 High  226 High    HDL  >=40 mg/dL 38 Abnormal  39 Abnormal  39 Abnormal  41   LDL  <100 mg/dL 136 High  136 High  126 High  139 High           Patient's 10 years cardiac risk is 18.4%.  However patient has no history of diabetes, CAD, or stroke.  Patient is reluctant to take any cholesterol-lowering medication at this time.    Severe obesity (BMI 35.0-39.9) with comorbidity (HCC)  BMI 36.8. The medical rationale for weight loss in obese individuals is that obesity is associated with a significant increase in mortality, and many health risks including type 2 diabetes mellitus, hypertension, dyslipidemia, and coronary heart disease. The benefits of weight loss include a reduction in the rate of progression from impaired glucose tolerance to diabetes, blood pressure in hypertensive patients, and lipid levels in higher risk patients. Other noncardiac benefits of weight loss include reductions in urinary incontinence, sleep apnea, and depression, and improvements in quality of life, physical functioning, and mobility.Recommend lifestyle modification: exercise 30 minutes per day 5 days per week. Recommend also portion control.          Patient Active Problem List    Diagnosis Date Noted    Impacted cerumen of right ear 01/09/2024    IGT (impaired glucose tolerance) 06/13/2022    Marijuana use 05/09/2022    Severe obesity (BMI 35.0-39.9) with comorbidity (HCC) 05/09/2022  "   Dyslipidemia 02/05/2021       Current Outpatient Medications   Medication Sig Dispense Refill    ibuprofen (MOTRIN) 200 MG Tab Take 200 mg by mouth every 8 hours as needed.       No current facility-administered medications for this visit.         Allergies as of 04/16/2024    (No Known Allergies)        ROS: Denies any chest pain, Shortness of breath, Changes bowel or bladder, Lower extremity edema.    Physical Exam:  /80 (BP Location: Right arm, Patient Position: Sitting, BP Cuff Size: Adult)   Pulse (!) 105   Temp (!) 35.3 °C (95.5 °F) (Temporal)   Resp 16   Ht 1.803 m (5' 11\")   Wt 120 kg (264 lb 5.3 oz)   SpO2 95%   BMI 36.87 kg/m²   Gen.: Well-developed, well-nourished, no apparent distress,pleasant and cooperative with the examination  Skin:  Warm and dry with good turgor. No rashes or suspicious lesions in visible areas  HEENT:Sinuses nontender with palpation, TMs clear, nares patent with pink mucosa and clear rhinorrhea,no septal deviation ,polyps or lesions. lips without lesions, oropharynx clear.  Neck: Trachea midline,no masses or adenopathy. No JVD.  Cor: Regular rate and rhythm without murmur, gallop or rub.  Lungs: Respirations unlabored.Clear to auscultation with equal breath sounds bilaterally. No wheezes, rhonchi.  Extremities: No cyanosis, clubbing or edema.          Assessment and Plan.   68 y.o. male     1. IGT (impaired glucose tolerance)  Most recent A1c was 6.0%, however patient has been asymptomatic.  Patient is counseled on lifestyle modification  We will continue monitor blood glucose.    - HEMOGLOBIN A1C; Future  - Lipid Profile; Future  - Basic Metabolic Panel; Future    2. Dyslipidemia  Most recent lipid panel showed high total cholesterol and LDL..  10 years cardiac risk is 18.4%.  However patient has no history of diabetes, CAD, or stroke.  Patient is reluctant to take any cholesterol-lowering medication at this time.  Patient is counseled on lifestyle " modification.  Will repeat lipid panel in 4 months, if still high will discuss statin.    - Lipid Profile; Future    3. Severe obesity (BMI 35.0-39.9) with comorbidity (HCC)  BMI 36.8  Patient is counseled on lifestyle modification

## 2024-05-22 ENCOUNTER — OFFICE VISIT (OUTPATIENT)
Dept: MEDICAL GROUP | Facility: MEDICAL CENTER | Age: 69
End: 2024-05-22
Payer: MEDICARE

## 2024-05-22 VITALS
WEIGHT: 264.55 LBS | SYSTOLIC BLOOD PRESSURE: 134 MMHG | HEART RATE: 123 BPM | BODY MASS INDEX: 37.04 KG/M2 | OXYGEN SATURATION: 95 % | RESPIRATION RATE: 18 BRPM | HEIGHT: 71 IN | TEMPERATURE: 98 F | DIASTOLIC BLOOD PRESSURE: 86 MMHG

## 2024-05-22 DIAGNOSIS — E66.01 SEVERE OBESITY (BMI 35.0-39.9) WITH COMORBIDITY (HCC): ICD-10-CM

## 2024-05-22 DIAGNOSIS — L98.9 SKIN LESIONS: ICD-10-CM

## 2024-05-22 DIAGNOSIS — E78.5 DYSLIPIDEMIA: ICD-10-CM

## 2024-05-22 DIAGNOSIS — R73.02 IGT (IMPAIRED GLUCOSE TOLERANCE): ICD-10-CM

## 2024-05-22 PROCEDURE — 99214 OFFICE O/P EST MOD 30 MIN: CPT | Performed by: FAMILY MEDICINE

## 2024-05-22 PROCEDURE — 3075F SYST BP GE 130 - 139MM HG: CPT | Performed by: FAMILY MEDICINE

## 2024-05-22 PROCEDURE — 3079F DIAST BP 80-89 MM HG: CPT | Performed by: FAMILY MEDICINE

## 2024-05-22 ASSESSMENT — FIBROSIS 4 INDEX: FIB4 SCORE: 0.91

## 2024-05-22 NOTE — PROGRESS NOTES
CC: Hyperlipidemia, impaired glucose tolerance, skin lesions, obesity    HPI:   Hima presents today to discuss the following:    Dyslipidemia  Last lipid panel showed          Component  Ref Range & Units 4 mo ago  (1/23/24) 1 yr ago  (3/14/23) 1 yr ago  (6/7/22) 3 yr ago  (10/14/20)   Cholesterol,Tot  100 - 199 mg/dL 208 High  203 High  200 High  225 High    Triglycerides  0 - 149 mg/dL 172 High  139 174 High  226 High    HDL  >=40 mg/dL 38 Abnormal  39 Abnormal  39 Abnormal  41   LDL  <100 mg/dL 136 High  136 High  126 High  139 High         Patient's 10 years cardiac risk is 21%.  Patient is reluctant to take statin.  Discussed referral to CT cardiac scoring, patient agreed      IGT (impaired glucose tolerance)  Last A1c was 6.0%, no history of diabetes.  Denies polyuria or polydipsia.  Currently not on medication.    Skin lesions  Has been having multiple skin lesions on different parts of his body's especially the back and the upper extremities, mostly benign.  However he requested referral to dermatology    Severe obesity (BMI 35.0-39.9) with comorbidity (HCC)  BMI is 36.9. The medical rationale for weight loss in obese individuals is that obesity is associated with a significant increase in mortality, and many health risks including type 2 diabetes mellitus, hypertension, dyslipidemia, and coronary heart disease. The benefits of weight loss include a reduction in the rate of progression from impaired glucose tolerance to diabetes, blood pressure in hypertensive patients, and lipid levels in higher risk patients. Other noncardiac benefits of weight loss include reductions in urinary incontinence, sleep apnea, and depression, and improvements in quality of life, physical functioning, and mobility.Recommend lifestyle modification: exercise 30 minutes per day 5 days per week. Recommend also portion control.      Patient Active Problem List    Diagnosis Date Noted    Impacted cerumen of right ear 01/09/2024     "IGT (impaired glucose tolerance) 06/13/2022    Marijuana use 05/09/2022    Severe obesity (BMI 35.0-39.9) with comorbidity (HCC) 05/09/2022    Dyslipidemia 02/05/2021       Current Outpatient Medications   Medication Sig Dispense Refill    ibuprofen (MOTRIN) 200 MG Tab Take 200 mg by mouth every 8 hours as needed.       No current facility-administered medications for this visit.         Allergies as of 05/22/2024    (No Known Allergies)        ROS: Denies any chest pain, Shortness of breath, Changes bowel or bladder, Lower extremity edema.    Physical Exam:  /86   Pulse (!) 123   Temp 36.7 °C (98 °F)   Resp 18   Ht 1.803 m (5' 11\")   Wt 120 kg (264 lb 8.8 oz)   SpO2 95%   BMI 36.90 kg/m²   Gen.: Well-developed, well-nourished, no apparent distress,pleasant and cooperative with the examination  Skin:  Warm and dry with good turgor.  Multiple skin lesions on the back and upper extremities, mostly benign.  HEENT:Sinuses nontender with palpation, TMs clear, nares patent with pink mucosa and clear rhinorrhea,no septal deviation ,polyps or lesions. lips without lesions, oropharynx clear.  Neck: Trachea midline,no masses or adenopathy. No JVD.  Cor: Regular rate and rhythm without murmur, gallop or rub.  Lungs: Respirations unlabored.Clear to auscultation with equal breath sounds bilaterally. No wheezes, rhonchi.  Extremities: No cyanosis, clubbing or edema.          Assessment and Plan.   69 y.o. male     1. Dyslipidemia  Last lipid panel showed high total cholesterol and LDL.  10 years cardiac risk is 21%.  Patient is reluctant to take statin.  Discussed referral to CT cardiac scoring, patient agreed  Patient is counseled on lifestyle modification.    - CT-CARDIAC SCORING; Future    2. IGT (impaired glucose tolerance)  Last A1c was 6.0%, no history of diabetes  Patient is counseled on lifestyle modification  Continue monitor blood glucose    3. Skin lesions  Has been having multiple skin lesions on " different parts of his body's especially the back and the upper extremities, mostly benign.  However he requested referral to dermatology    - Referral to Dermatology    4. Severe obesity (BMI 35.0-39.9) with comorbidity (HCC)  BMI is 36.9  Patient is counseled on lifestyle modification.

## 2024-08-19 ENCOUNTER — TELEPHONE (OUTPATIENT)
Dept: HEALTH INFORMATION MANAGEMENT | Facility: OTHER | Age: 69
End: 2024-08-19
Payer: MEDICARE

## 2025-01-16 ENCOUNTER — TELEPHONE (OUTPATIENT)
Dept: HEALTH INFORMATION MANAGEMENT | Facility: OTHER | Age: 70
End: 2025-01-16

## 2025-01-21 ASSESSMENT — PATIENT HEALTH QUESTIONNAIRE - PHQ9
2. FEELING DOWN, DEPRESSED, IRRITABLE, OR HOPELESS: NOT AT ALL
1. LITTLE INTEREST OR PLEASURE IN DOING THINGS: NOT AT ALL

## 2025-01-21 ASSESSMENT — ACTIVITIES OF DAILY LIVING (ADL): BATHING_REQUIRES_ASSISTANCE: 0

## 2025-01-21 ASSESSMENT — ENCOUNTER SYMPTOMS: GENERAL WELL-BEING: GOOD

## 2025-01-21 NOTE — ASSESSMENT & PLAN NOTE
Chronic, stable. Weight in office today is 263 lbs. BMI 36.68 kg/m2. We discussed his current diet/exercise regimen. Encouraged to remain physically active as well as monitor intake of excess calories. Follow up with PCP for continued monitoring.

## 2025-01-21 NOTE — ASSESSMENT & PLAN NOTE
Chronic, stable. He has had this conversation with his PCP in the past. He is reluctant to take any lipid-lowering medications. His PCP ordered him a CT cardiac score in May 2024. Encouraged patient to complete this screening. He remains active by volunteering and walking his dog. States he prepares his own food primarily, but does like to indulge in sugary snacks often. Follow up with PCP for continued monitoring and management.  Lab Results   Component Value Date/Time    CHOLSTRLTOT 208 (H) 01/23/2024 09:18 AM    TRIGLYCERIDE 172 (H) 01/23/2024 09:18 AM    HDL 38 (A) 01/23/2024 09:18 AM     (H) 01/23/2024 09:18 AM     The 10-year ASCVD risk score (Lyndsey YUAN, et al., 2019) is: 21%    Values used to calculate the score:      Age: 69 years      Sex: Male      Is Non- : No      Diabetic: No      Tobacco smoker: No      Systolic Blood Pressure: 134 mmHg      Is BP treated: No      HDL Cholesterol: 38 mg/dL      Total Cholesterol: 208 mg/dL

## 2025-01-21 NOTE — ASSESSMENT & PLAN NOTE
Chronic, stable. We discussed his dietary/lifestyle regimen. Follow up with PCP for continued monitoring.  Lab Results   Component Value Date/Time    HBA1C 6.0 (H) 01/23/2024 0918    AVGLUC 126 01/23/2024 0918

## 2025-01-22 ENCOUNTER — OFFICE VISIT (OUTPATIENT)
Dept: FAMILY PLANNING/WOMEN'S HEALTH CLINIC | Facility: PHYSICIAN GROUP | Age: 70
End: 2025-01-22
Payer: MEDICARE

## 2025-01-22 VITALS
BODY MASS INDEX: 36.82 KG/M2 | HEIGHT: 71 IN | SYSTOLIC BLOOD PRESSURE: 112 MMHG | HEART RATE: 86 BPM | WEIGHT: 263 LBS | OXYGEN SATURATION: 94 % | DIASTOLIC BLOOD PRESSURE: 74 MMHG

## 2025-01-22 DIAGNOSIS — E66.01 SEVERE OBESITY (BMI 35.0-39.9) WITH COMORBIDITY (HCC): ICD-10-CM

## 2025-01-22 DIAGNOSIS — R73.02 IGT (IMPAIRED GLUCOSE TOLERANCE): ICD-10-CM

## 2025-01-22 DIAGNOSIS — E78.5 DYSLIPIDEMIA: ICD-10-CM

## 2025-01-22 PROCEDURE — 3078F DIAST BP <80 MM HG: CPT

## 2025-01-22 PROCEDURE — 3074F SYST BP LT 130 MM HG: CPT

## 2025-01-22 PROCEDURE — 1126F AMNT PAIN NOTED NONE PRSNT: CPT

## 2025-01-22 PROCEDURE — G0439 PPPS, SUBSEQ VISIT: HCPCS

## 2025-01-22 SDOH — ECONOMIC STABILITY: TRANSPORTATION INSECURITY: IN THE PAST 12 MONTHS, HAS LACK OF TRANSPORTATION KEPT YOU FROM MEDICAL APPOINTMENTS OR FROM GETTING MEDICATIONS?: NO

## 2025-01-22 SDOH — ECONOMIC STABILITY: HOUSING INSECURITY: IN THE PAST 12 MONTHS, HOW MANY TIMES HAVE YOU MOVED WHERE YOU WERE LIVING?: 1

## 2025-01-22 SDOH — ECONOMIC STABILITY: FOOD INSECURITY: WITHIN THE PAST 12 MONTHS, THE FOOD YOU BOUGHT JUST DIDN'T LAST AND YOU DIDN'T HAVE MONEY TO GET MORE.: NEVER TRUE

## 2025-01-22 SDOH — ECONOMIC STABILITY: HOUSING INSECURITY: IN THE LAST 12 MONTHS, WAS THERE A TIME WHEN YOU WERE NOT ABLE TO PAY THE MORTGAGE OR RENT ON TIME?: NO

## 2025-01-22 SDOH — ECONOMIC STABILITY: HOUSING INSECURITY: AT ANY TIME IN THE PAST 12 MONTHS, WERE YOU HOMELESS OR LIVING IN A SHELTER (INCLUDING NOW)?: NO

## 2025-01-22 SDOH — ECONOMIC STABILITY: FOOD INSECURITY: WITHIN THE PAST 12 MONTHS, YOU WORRIED THAT YOUR FOOD WOULD RUN OUT BEFORE YOU GOT THE MONEY TO BUY MORE.: NEVER TRUE

## 2025-01-22 SDOH — ECONOMIC STABILITY: FOOD INSECURITY: HOW HARD IS IT FOR YOU TO PAY FOR THE VERY BASICS LIKE FOOD, HOUSING, MEDICAL CARE, AND HEATING?: NOT HARD AT ALL

## 2025-01-22 ASSESSMENT — PATIENT HEALTH QUESTIONNAIRE - PHQ9: CLINICAL INTERPRETATION OF PHQ2 SCORE: 0

## 2025-01-22 ASSESSMENT — PAIN SCALES - GENERAL: PAINLEVEL_OUTOF10: NO PAIN

## 2025-01-22 ASSESSMENT — ACTIVITIES OF DAILY LIVING (ADL): LACK_OF_TRANSPORTATION: NO

## 2025-01-22 ASSESSMENT — FIBROSIS 4 INDEX: FIB4 SCORE: 0.91

## 2025-01-22 NOTE — PROGRESS NOTES
Comprehensive Health Assessment Program     Hima Barrios is a 69 y.o. here for his comprehensive health assessment.    Patient Active Problem List    Diagnosis Date Noted    Impacted cerumen of right ear 2024    IGT (impaired glucose tolerance) 2022    Marijuana use 2022    Severe obesity (BMI 35.0-39.9) with comorbidity (HCC) 2022    Dyslipidemia 2021       Current Outpatient Medications   Medication Sig Dispense Refill    ibuprofen (MOTRIN) 200 MG Tab Take 200 mg by mouth every 8 hours as needed.       No current facility-administered medications for this visit.          Current supplements as per medication list.     Allergies:   Patient has no known allergies.  Social History     Tobacco Use    Smoking status: Former     Current packs/day: 0.00     Average packs/day: 1.5 packs/day for 26.0 years (39.0 ttl pk-yrs)     Types: Cigarettes     Start date:      Quit date:      Years since quittin.0    Smokeless tobacco: Never   Vaping Use    Vaping status: Never Used   Substance Use Topics    Alcohol use: Not Currently    Drug use: Yes     Types: Marijuana, Inhaled     Comment: daily     Family History   Problem Relation Age of Onset    Cancer Mother         lung    Cancer Father         brain     Hima  has a past medical history of Obesity (BMI 30-39.9) (2021).   Past Surgical History:   Procedure Laterality Date    APPENDECTOMY         Screening:  In the last six months have you experienced any leakage of urine? No    Depression Screening  Little interest or pleasure in doing things?  0 - not at all  Feeling down, depressed , or hopeless? 0 - not at all  Trouble falling or staying asleep, or sleeping too much?     Feeling tired or having little energy?     Poor appetite or overeating?     Feeling bad about yourself - or that you are a failure or have let yourself or your family down?    Trouble concentrating on things, such as reading the newspaper or  watching television?    Moving or speaking so slowly that other people could have noticed.  Or the opposite - being so fidgety or restless that you have been moving around a lot more than usual?     Thoughts that you would be better off dead, or of hurting yourself?     Patient Health Questionnaire Score:      If depressive symptoms identified deferred to follow up visit unless specifically addressed in assessment and plan.    Interpretation of PHQ-9 Total Score   Score Severity   1-4 No Depression   5-9 Mild Depression   10-14 Moderate Depression   15-19 Moderately Severe Depression   20-27 Severe Depression    Screening for Cognitive Impairment  Do you or any of your friends or family members have any concern about your memory? No  Three Minute Recall (Leader, Season, Table) 3/3    Guillermo clock face with all 12 numbers and set the hands to show 10 minutes after 11.  Yes    Cognitive concerns identified deferred for follow up unless specifically addressed in assessment and plan.    Fall Risk Assessment  Has the patient had two or more falls in the last year or any fall with injury in the last year?  No    Safety Assessment  Do you always wear your seatbelt?  Yes  Any changes to home needed to function safely? No  Difficulty hearing.  Yes  Patient counseled about all safety risks that were identified.    Functional Assessment ADLs  Are there any barriers preventing you from cooking for yourself or meeting nutritional needs?  No.    Are there any barriers preventing you from driving safely or obtaining transportation?  No.    Are there any barriers preventing you from using a telephone or calling for help?  No    Are there any barriers preventing you from shopping?  No.    Are there any barriers preventing you from taking care of your own finances?  No    Are there any barriers preventing you from managing your medications?  No    Are there any barriers preventing you from showering, bathing or dressing yourself? No     Are there any barriers preventing you from doing housework or laundry? No    Are there any barriers preventing you from using the toilet?No    Are you currently engaging in any exercise or physical activity?  Yes.  Walking the dog. Volunteering. Has a band.    Self-Assessment of Health  What is your perception of your health? Good    Do you sleep more than six hours a night? Yes    In the past 7 days, how much did pain keep you from doing your normal work? None    Do you spend quality time with family or friends (virtually or in person)? Yes    Do you usually eat a heart healthy diet that constists of a variety of fruits, vegetables, whole grains and fiber? Yes    Do you eat foods high in fat and/or Fast Food more than three times per week? No    How concerned are you that your medical conditions are not being well managed? Not at all    Are you worried that in the next 2 months, you may not have stable housing that you own, rent, or stay in as part of a household? No        Advance Care Planning  Do you have an Advance Directive, Living Will, Durable Power of , or POLST? No    Health Maintenance Summary            Overdue - Hepatitis C Screening (Once) Never done      No completion history exists for this topic.              Overdue - IMM DTaP/Tdap/Td Vaccine (1 - Tdap) Never done      No completion history exists for this topic.              Overdue - COVID-19 Vaccine (4 - 2024-25 season) Overdue since 9/1/2024      10/07/2023  Imm Admin: Covid-19 Mrna (Spikevax) Moderna 12+ Years    03/22/2021  Imm Admin: PFIZER PURPLE CAP SARS-COV-2 VACCINATION (12+)    03/01/2021  Imm Admin: PFIZER PURPLE CAP SARS-COV-2 VACCINATION (12+)              Postponed - Pneumococcal Vaccine: 65+ Years (2 of 2 - PCV) Postponed until 1/28/2025 09/26/2020  Imm Admin: Pneumococcal polysaccharide vaccine (PPSV-23)              Postponed - Zoster (Shingles) Vaccines (1 of 2) Postponed until 8/11/2025      No completion  history exists for this topic.              Postponed - Influenza Vaccine (1) Postponed until 1/28/2026      10/07/2023  Imm Admin: Influenza, Unspecified - HISTORICAL DATA    09/26/2020  Imm Admin: Influenza Vaccine Adult HD              Annual Wellness Visit (Yearly) Next due on 1/22/2026 01/22/2025  Level of Service: OK ANNUAL WELLNESS VISIT-INCLUDES PPPS SUBSEQUE*    01/17/2024  Visit Dx: Medicare annual wellness visit, subsequent    01/17/2024  Subsequent Annual Wellness Visit - Includes PPPS ()    01/09/2024  Level of Service: OK ANNUAL WELLNESS VISIT-INCLUDES PPPS SUBSEQUE*    03/01/2023  Visit Dx: Medicare annual wellness visit, subsequent    Only the first 5 history entries have been loaded, but more history exists.              Colorectal Cancer Screening (Colonoscopy - Every 5 Years) Next due on 6/3/2026      06/03/2021  REFERRAL TO GI FOR COLONOSCOPY              Hepatitis A Vaccine (Hep A) (Series Information) Aged Out      No completion history exists for this topic.              Hepatitis B Vaccine (Hep B) (Series Information) Aged Out      No completion history exists for this topic.              HPV Vaccines (Series Information) Aged Out      No completion history exists for this topic.              Polio Vaccine (Inactivated Polio) (Series Information) Aged Out      No completion history exists for this topic.              Meningococcal Immunization (Series Information) Aged Out      No completion history exists for this topic.              Discontinued - Abdominal Aortic Aneurysm (AAA) Screening  Discontinued      No completion history exists for this topic.                    Patient Care Team:  Jose Kim M.D. as PCP - General (Geriatric Medicine - Family Medicine)    Financial Resource Strain: Low Risk  (1/22/2025)    Overall Financial Resource Strain (CARDIA)     Difficulty of Paying Living Expenses: Not hard at all      Transportation Needs: No Transportation Needs  "(1/22/2025)    PRAPARE - Transportation     Lack of Transportation (Medical): No     Lack of Transportation (Non-Medical): No      Food Insecurity: No Food Insecurity (1/22/2025)    Hunger Vital Sign     Worried About Running Out of Food in the Last Year: Never true     Ran Out of Food in the Last Year: Never true        Encounter Vitals  Blood Pressure : 112/74  Pulse: 86  Pulse Oximetry: 94 %  Weight: 119 kg (263 lb)  Height: 180.3 cm (5' 11\")  BMI (Calculated): 36.68  Pain Score: No pain     Physical Exam:  Constitutional: NAD  HENMT: NC/AT, EOMI  Cardiovascular: RRR, No m/r/g  Lungs: CTAB, no w/r/r  Neurologic: Alert & oriented x3, CN II-XII grossly intact    Assessment and Plan. The following treatment and monitoring plan is recommended:  Dyslipidemia  Chronic, stable. He has had this conversation with his PCP in the past. He is reluctant to take any lipid-lowering medications. His PCP ordered him a CT cardiac score in May 2024. Encouraged patient to complete this screening. He remains active by volunteering and walking his dog. States he prepares his own food primarily, but does like to indulge in sugary snacks often. Follow up with PCP for continued monitoring and management.  Lab Results   Component Value Date/Time    CHOLSTRLTOT 208 (H) 01/23/2024 09:18 AM    TRIGLYCERIDE 172 (H) 01/23/2024 09:18 AM    HDL 38 (A) 01/23/2024 09:18 AM     (H) 01/23/2024 09:18 AM     The 10-year ASCVD risk score (Lyndsey YUAN, et al., 2019) is: 21%    Values used to calculate the score:      Age: 69 years      Sex: Male      Is Non- : No      Diabetic: No      Tobacco smoker: No      Systolic Blood Pressure: 134 mmHg      Is BP treated: No      HDL Cholesterol: 38 mg/dL      Total Cholesterol: 208 mg/dL      IGT (impaired glucose tolerance)  Chronic, stable. We discussed his dietary/lifestyle regimen. Follow up with PCP for continued monitoring.  Lab Results   Component Value Date/Time    HBA1C 6.0 " (H) 01/23/2024 0918    AVGLUC 126 01/23/2024 0918         Severe obesity (BMI 35.0-39.9) with comorbidity (HCC)  Chronic, stable. Weight in office today is 263 lbs. BMI 36.68 kg/m2. We discussed his current diet/exercise regimen. Encouraged to remain physically active as well as monitor intake of excess calories. Follow up with PCP for continued monitoring.      Services suggested: No services needed at this time  Health Care Screening: Age-appropriate preventive services recommended by USPTF and ACIP covered by Medicare were discussed today. Services ordered if indicated and agreed upon by the patient.  Referrals offered: Community-based lifestyle interventions to reduce health risks and promote self-management and wellness, fall prevention, nutrition, physical activity, tobacco-use cessation, weight loss, and mental health services as per orders if indicated.    Discussion today about general wellness and lifestyle habits:    Prevent falls and reduce trip hazards; Cautioned about securing or removing rugs.  Have a working fire alarm and carbon monoxide detector.  Engage in regular physical activity and social activities.    Follow-up: Return for appointment with Primary Care Provider as needed..

## 2025-01-28 SDOH — ECONOMIC STABILITY: FOOD INSECURITY: WITHIN THE PAST 12 MONTHS, THE FOOD YOU BOUGHT JUST DIDN'T LAST AND YOU DIDN'T HAVE MONEY TO GET MORE.: NEVER TRUE

## 2025-01-28 SDOH — ECONOMIC STABILITY: FOOD INSECURITY: WITHIN THE PAST 12 MONTHS, YOU WORRIED THAT YOUR FOOD WOULD RUN OUT BEFORE YOU GOT MONEY TO BUY MORE.: SOMETIMES TRUE

## 2025-01-28 SDOH — ECONOMIC STABILITY: INCOME INSECURITY: IN THE LAST 12 MONTHS, WAS THERE A TIME WHEN YOU WERE NOT ABLE TO PAY THE MORTGAGE OR RENT ON TIME?: NO

## 2025-01-28 SDOH — HEALTH STABILITY: PHYSICAL HEALTH: ON AVERAGE, HOW MANY DAYS PER WEEK DO YOU ENGAGE IN MODERATE TO STRENUOUS EXERCISE (LIKE A BRISK WALK)?: 2 DAYS

## 2025-01-28 SDOH — ECONOMIC STABILITY: INCOME INSECURITY: HOW HARD IS IT FOR YOU TO PAY FOR THE VERY BASICS LIKE FOOD, HOUSING, MEDICAL CARE, AND HEATING?: NOT VERY HARD

## 2025-01-28 SDOH — HEALTH STABILITY: PHYSICAL HEALTH: ON AVERAGE, HOW MANY MINUTES DO YOU ENGAGE IN EXERCISE AT THIS LEVEL?: 20 MIN

## 2025-01-28 ASSESSMENT — SOCIAL DETERMINANTS OF HEALTH (SDOH)
DO YOU BELONG TO ANY CLUBS OR ORGANIZATIONS SUCH AS CHURCH GROUPS UNIONS, FRATERNAL OR ATHLETIC GROUPS, OR SCHOOL GROUPS?: YES
HOW OFTEN DO YOU GET TOGETHER WITH FRIENDS OR RELATIVES?: MORE THAN THREE TIMES A WEEK
HOW HARD IS IT FOR YOU TO PAY FOR THE VERY BASICS LIKE FOOD, HOUSING, MEDICAL CARE, AND HEATING?: NOT VERY HARD
WITHIN THE PAST 12 MONTHS, YOU WORRIED THAT YOUR FOOD WOULD RUN OUT BEFORE YOU GOT THE MONEY TO BUY MORE: SOMETIMES TRUE
IN A TYPICAL WEEK, HOW MANY TIMES DO YOU TALK ON THE PHONE WITH FAMILY, FRIENDS, OR NEIGHBORS?: MORE THAN THREE TIMES A WEEK
HOW OFTEN DO YOU HAVE A DRINK CONTAINING ALCOHOL: MONTHLY OR LESS
HOW OFTEN DO YOU ATTENT MEETINGS OF THE CLUB OR ORGANIZATION YOU BELONG TO?: MORE THAN 4 TIMES PER YEAR
DO YOU BELONG TO ANY CLUBS OR ORGANIZATIONS SUCH AS CHURCH GROUPS UNIONS, FRATERNAL OR ATHLETIC GROUPS, OR SCHOOL GROUPS?: YES
IN THE PAST 12 MONTHS, HAS THE ELECTRIC, GAS, OIL, OR WATER COMPANY THREATENED TO SHUT OFF SERVICE IN YOUR HOME?: NO
HOW OFTEN DO YOU ATTENT MEETINGS OF THE CLUB OR ORGANIZATION YOU BELONG TO?: MORE THAN 4 TIMES PER YEAR
HOW OFTEN DO YOU ATTEND CHURCH OR RELIGIOUS SERVICES?: NEVER
HOW MANY DRINKS CONTAINING ALCOHOL DO YOU HAVE ON A TYPICAL DAY WHEN YOU ARE DRINKING: 1 OR 2
IN A TYPICAL WEEK, HOW MANY TIMES DO YOU TALK ON THE PHONE WITH FAMILY, FRIENDS, OR NEIGHBORS?: MORE THAN THREE TIMES A WEEK
HOW OFTEN DO YOU GET TOGETHER WITH FRIENDS OR RELATIVES?: MORE THAN THREE TIMES A WEEK
HOW OFTEN DO YOU ATTEND CHURCH OR RELIGIOUS SERVICES?: NEVER
HOW OFTEN DO YOU HAVE SIX OR MORE DRINKS ON ONE OCCASION: NEVER

## 2025-01-28 ASSESSMENT — LIFESTYLE VARIABLES
SKIP TO QUESTIONS 9-10: 1
HOW MANY STANDARD DRINKS CONTAINING ALCOHOL DO YOU HAVE ON A TYPICAL DAY: 1 OR 2
AUDIT-C TOTAL SCORE: 1
HOW OFTEN DO YOU HAVE A DRINK CONTAINING ALCOHOL: MONTHLY OR LESS
HOW OFTEN DO YOU HAVE SIX OR MORE DRINKS ON ONE OCCASION: NEVER

## 2025-01-29 ENCOUNTER — OFFICE VISIT (OUTPATIENT)
Dept: MEDICAL GROUP | Facility: MEDICAL CENTER | Age: 70
End: 2025-01-29
Payer: MEDICARE

## 2025-01-29 VITALS
HEART RATE: 103 BPM | RESPIRATION RATE: 18 BRPM | HEIGHT: 71 IN | WEIGHT: 265 LBS | OXYGEN SATURATION: 96 % | SYSTOLIC BLOOD PRESSURE: 124 MMHG | DIASTOLIC BLOOD PRESSURE: 80 MMHG | TEMPERATURE: 97.9 F | BODY MASS INDEX: 37.1 KG/M2

## 2025-01-29 DIAGNOSIS — Z23 NEED FOR VACCINATION: ICD-10-CM

## 2025-01-29 DIAGNOSIS — E66.01 SEVERE OBESITY (BMI 35.0-39.9) WITH COMORBIDITY (HCC): ICD-10-CM

## 2025-01-29 DIAGNOSIS — E78.5 DYSLIPIDEMIA: ICD-10-CM

## 2025-01-29 DIAGNOSIS — Z11.59 ENCOUNTER FOR HEPATITIS C SCREENING TEST FOR LOW RISK PATIENT: ICD-10-CM

## 2025-01-29 DIAGNOSIS — R73.02 IGT (IMPAIRED GLUCOSE TOLERANCE): ICD-10-CM

## 2025-01-29 DIAGNOSIS — N40.1 BENIGN PROSTATIC HYPERPLASIA WITH URINARY FREQUENCY: ICD-10-CM

## 2025-01-29 DIAGNOSIS — Z12.5 PROSTATE CANCER SCREENING: ICD-10-CM

## 2025-01-29 DIAGNOSIS — R35.0 BENIGN PROSTATIC HYPERPLASIA WITH URINARY FREQUENCY: ICD-10-CM

## 2025-01-29 PROBLEM — H61.21 IMPACTED CERUMEN OF RIGHT EAR: Status: RESOLVED | Noted: 2024-01-09 | Resolved: 2025-01-29

## 2025-01-29 PROBLEM — F12.90 MARIJUANA USE: Status: RESOLVED | Noted: 2022-05-09 | Resolved: 2025-01-29

## 2025-01-29 RX ORDER — TAMSULOSIN HYDROCHLORIDE 0.4 MG/1
0.4 CAPSULE ORAL
Qty: 90 CAPSULE | Refills: 3 | Status: SHIPPED | OUTPATIENT
Start: 2025-01-29

## 2025-01-29 ASSESSMENT — ENCOUNTER SYMPTOMS
PALPITATIONS: 0
VOMITING: 0
SHORTNESS OF BREATH: 0
WEIGHT LOSS: 0
FEVER: 0
CHILLS: 0
NAUSEA: 0
WHEEZING: 0
DIZZINESS: 0
HEADACHES: 0

## 2025-01-29 ASSESSMENT — FIBROSIS 4 INDEX: FIB4 SCORE: 0.91

## 2025-01-29 ASSESSMENT — PATIENT HEALTH QUESTIONNAIRE - PHQ9: CLINICAL INTERPRETATION OF PHQ2 SCORE: 0

## 2025-01-29 NOTE — PROGRESS NOTES
"Subjective:     CC:     HPI:   Hima presents today with    Former patient of Jose Kim M.D.   PMH HLD, IFG, BMI, last PSA 1.23    Overall patient is doing well present to establish.  Would like to refrain from statin therapy for dyslipidemia.  He does note daytime urinary frequency/decrease in stream and nocturia x 2 last PSA 1.23 for recheck PSA.  Patient is willing to start tamsulosin  Problem   Benign Prostatic Hyperplasia With Urinary Frequency   Impacted Cerumen of Right Ear (Resolved)   Marijuana Use (Resolved)       Health Maintenance:     ROS:  Review of Systems   Constitutional:  Negative for chills, fever and weight loss.   HENT:  Negative for hearing loss.    Respiratory:  Negative for shortness of breath and wheezing.    Cardiovascular:  Negative for chest pain and palpitations.   Gastrointestinal:  Negative for nausea and vomiting.   Genitourinary:  Negative for frequency and urgency.   Skin:  Negative for rash.   Neurological:  Negative for dizziness and headaches.       Objective:     Exam:  /80 (BP Location: Right arm, Patient Position: Sitting)   Pulse (!) 103   Temp 36.6 °C (97.9 °F) (Temporal)   Resp 18   Ht 1.803 m (5' 11\")   Wt 120 kg (265 lb)   SpO2 96%   BMI 36.96 kg/m²  Body mass index is 36.96 kg/m².    Physical Exam  Constitutional:       Appearance: Normal appearance.   Cardiovascular:      Rate and Rhythm: Normal rate and regular rhythm.   Pulmonary:      Effort: Pulmonary effort is normal.      Breath sounds: Normal breath sounds.   Musculoskeletal:      Cervical back: Normal range of motion and neck supple.   Neurological:      Mental Status: He is alert.           Labs:     Assessment & Plan:     69 y.o. male with the following -     1. IGT (impaired glucose tolerance)  Chronic stable  Controlled with lifestyle  Denies ss hyperglycemia  - HEMOGLOBIN A1C; Future    2. Severe obesity (BMI 35.0-39.9) with comorbidity (HCC)  Chronic stable discussed exercise "   - Patient identified as having weight management issue.  Appropriate orders and counseling given.  - CBC WITHOUT DIFFERENTIAL; Future  - Lipid Profile; Future  - TSH WITH REFLEX TO FT4; Future  - HEMOGLOBIN A1C; Future  - Comp Metabolic Panel; Future    3. Dyslipidemia  Chronic stable     - Lipid Profile; Future    4. Encounter for hepatitis C screening test for low risk patient    - HEP C VIRUS ANTIBODY; Future    5 bph   With am frequency, decrease stream  Discuss a/e tamsulosin, tamsulosin 0.4mg daily sent    6 prostate cancer screening    7. Need for vacc  Pcv20 given today     Return in about 1 year (around 1/29/2026), or if symptoms worsen or fail to improve, for Lab review, Med check.    Please note that this dictation was created using voice recognition software. I have made every reasonable attempt to correct obvious errors, but I expect that there are errors of grammar and possibly content that I did not discover before finalizing the note.

## 2025-02-24 ENCOUNTER — OFFICE VISIT (OUTPATIENT)
Dept: URGENT CARE | Facility: PHYSICIAN GROUP | Age: 70
End: 2025-02-24
Payer: MEDICARE

## 2025-02-24 ENCOUNTER — HOSPITAL ENCOUNTER (OUTPATIENT)
Dept: LAB | Facility: MEDICAL CENTER | Age: 70
End: 2025-02-24
Attending: STUDENT IN AN ORGANIZED HEALTH CARE EDUCATION/TRAINING PROGRAM
Payer: MEDICARE

## 2025-02-24 ENCOUNTER — RESULTS FOLLOW-UP (OUTPATIENT)
Dept: MEDICAL GROUP | Facility: PHYSICIAN GROUP | Age: 70
End: 2025-02-24

## 2025-02-24 VITALS
TEMPERATURE: 98.1 F | HEIGHT: 71 IN | OXYGEN SATURATION: 100 % | DIASTOLIC BLOOD PRESSURE: 66 MMHG | WEIGHT: 268 LBS | BODY MASS INDEX: 37.52 KG/M2 | SYSTOLIC BLOOD PRESSURE: 126 MMHG | HEART RATE: 65 BPM | RESPIRATION RATE: 14 BRPM

## 2025-02-24 DIAGNOSIS — E78.5 DYSLIPIDEMIA: ICD-10-CM

## 2025-02-24 DIAGNOSIS — L03.011 PARONYCHIA OF RIGHT INDEX FINGER: ICD-10-CM

## 2025-02-24 DIAGNOSIS — R73.02 IGT (IMPAIRED GLUCOSE TOLERANCE): ICD-10-CM

## 2025-02-24 DIAGNOSIS — E66.01 SEVERE OBESITY (BMI 35.0-39.9) WITH COMORBIDITY (HCC): ICD-10-CM

## 2025-02-24 DIAGNOSIS — Z11.59 ENCOUNTER FOR HEPATITIS C SCREENING TEST FOR LOW RISK PATIENT: ICD-10-CM

## 2025-02-24 DIAGNOSIS — Z12.5 PROSTATE CANCER SCREENING: ICD-10-CM

## 2025-02-24 LAB
ALBUMIN SERPL BCP-MCNC: 4 G/DL (ref 3.2–4.9)
ALBUMIN/GLOB SERPL: 1.3 G/DL
ALP SERPL-CCNC: 74 U/L (ref 30–99)
ALT SERPL-CCNC: 28 U/L (ref 2–50)
ANION GAP SERPL CALC-SCNC: 12 MMOL/L (ref 7–16)
AST SERPL-CCNC: 21 U/L (ref 12–45)
BILIRUB SERPL-MCNC: 0.4 MG/DL (ref 0.1–1.5)
BUN SERPL-MCNC: 23 MG/DL (ref 8–22)
CALCIUM ALBUM COR SERPL-MCNC: 9.4 MG/DL (ref 8.5–10.5)
CALCIUM SERPL-MCNC: 9.4 MG/DL (ref 8.5–10.5)
CHLORIDE SERPL-SCNC: 107 MMOL/L (ref 96–112)
CHOLEST SERPL-MCNC: 195 MG/DL (ref 100–199)
CO2 SERPL-SCNC: 21 MMOL/L (ref 20–33)
CREAT SERPL-MCNC: 1.14 MG/DL (ref 0.5–1.4)
ERYTHROCYTE [DISTWIDTH] IN BLOOD BY AUTOMATED COUNT: 48.1 FL (ref 35.9–50)
EST. AVERAGE GLUCOSE BLD GHB EST-MCNC: 140 MG/DL
GFR SERPLBLD CREATININE-BSD FMLA CKD-EPI: 69 ML/MIN/1.73 M 2
GLOBULIN SER CALC-MCNC: 3 G/DL (ref 1.9–3.5)
GLUCOSE SERPL-MCNC: 106 MG/DL (ref 65–99)
HBA1C MFR BLD: 6.5 % (ref 4–5.6)
HCT VFR BLD AUTO: 50.9 % (ref 42–52)
HCV AB SER QL: NORMAL
HDLC SERPL-MCNC: 41 MG/DL
HGB BLD-MCNC: 16.8 G/DL (ref 14–18)
LDLC SERPL CALC-MCNC: 127 MG/DL
MCH RBC QN AUTO: 30.2 PG (ref 27–33)
MCHC RBC AUTO-ENTMCNC: 33 G/DL (ref 32.3–36.5)
MCV RBC AUTO: 91.4 FL (ref 81.4–97.8)
PLATELET # BLD AUTO: 296 K/UL (ref 164–446)
PMV BLD AUTO: 9.4 FL (ref 9–12.9)
POTASSIUM SERPL-SCNC: 4.6 MMOL/L (ref 3.6–5.5)
PROT SERPL-MCNC: 7 G/DL (ref 6–8.2)
PSA SERPL DL<=0.01 NG/ML-MCNC: 1.42 NG/ML (ref 0–4)
RBC # BLD AUTO: 5.57 M/UL (ref 4.7–6.1)
SODIUM SERPL-SCNC: 140 MMOL/L (ref 135–145)
TRIGL SERPL-MCNC: 135 MG/DL (ref 0–149)
TSH SERPL DL<=0.005 MIU/L-ACNC: 2.18 UIU/ML (ref 0.38–5.33)
WBC # BLD AUTO: 7.2 K/UL (ref 4.8–10.8)

## 2025-02-24 PROCEDURE — 36415 COLL VENOUS BLD VENIPUNCTURE: CPT

## 2025-02-24 PROCEDURE — 84153 ASSAY OF PSA TOTAL: CPT

## 2025-02-24 PROCEDURE — 80053 COMPREHEN METABOLIC PANEL: CPT

## 2025-02-24 PROCEDURE — 86803 HEPATITIS C AB TEST: CPT

## 2025-02-24 PROCEDURE — 83036 HEMOGLOBIN GLYCOSYLATED A1C: CPT

## 2025-02-24 PROCEDURE — 10060 I&D ABSCESS SIMPLE/SINGLE: CPT | Performed by: NURSE PRACTITIONER

## 2025-02-24 PROCEDURE — 80061 LIPID PANEL: CPT

## 2025-02-24 PROCEDURE — 85027 COMPLETE CBC AUTOMATED: CPT

## 2025-02-24 PROCEDURE — 84443 ASSAY THYROID STIM HORMONE: CPT

## 2025-02-24 RX ORDER — SULFAMETHOXAZOLE AND TRIMETHOPRIM 800; 160 MG/1; MG/1
1 TABLET ORAL 2 TIMES DAILY
Qty: 14 TABLET | Refills: 0 | Status: SHIPPED | OUTPATIENT
Start: 2025-02-24 | End: 2025-03-03

## 2025-02-24 ASSESSMENT — FIBROSIS 4 INDEX: FIB4 SCORE: 0.91

## 2025-02-24 NOTE — PROGRESS NOTES
"Subjective     Hima Barrios is a 69 y.o. male who presents with Finger Pain (Swelling, R pointer, x1wk )            HPI    ROS           Objective     /66 (BP Location: Left arm, Patient Position: Sitting, BP Cuff Size: Adult)   Pulse 65   Temp 36.7 °C (98.1 °F) (Temporal)   Resp 14   Ht 1.803 m (5' 11\")   Wt 122 kg (268 lb)   SpO2 100%   BMI 37.38 kg/m²      Physical Exam  Constitutional:       Appearance: Normal appearance.   Musculoskeletal:         General: Normal range of motion.   Skin:     General: Skin is warm and dry.      Comments: Patient with erythema and swelling with fluctuance to the distal aspect of his right index finger along the lateral aspect of his nail nailbed.  He has consented verbally to opening this up and draining it.   Neurological:      General: No focal deficit present.      Mental Status: He is alert and oriented to person, place, and time.   Psychiatric:         Mood and Affect: Mood normal.                  Procedure: Incision and Drainage  -Risks, benefits, and alternatives discussed. Risks include infection, bleeding, nerve damage, and poor cosmetic outcome  -Clean technique with sterile instruments  -Local anesthesia with 2% lidocaine -Incision with #11 blade into fluctuant area with purulent material expressed  --Irrigated copiously with sterile saline  -Packed with 1/4\" gauze  -Minimal bleeding with good hemostasis achieved  -The patient tolerated the procedure well                  Assessment & Plan  Paronychia of right index finger    Orders:    sulfamethoxazole-trimethoprim (BACTRIM DS) 800-160 MG tablet; Take 1 Tablet by mouth 2 times a day for 7 days.       Patient tolerated above procedure well.  He is given home care instructions for Epson salt soaks and cleaning with soap and water only.  He is advised on follow-up in 72 hours if symptoms are not improving.  I placed him on a 7-day course of Bactrim.  He is verbalized understanding of these care " instructions.

## 2025-04-14 ENCOUNTER — OFFICE VISIT (OUTPATIENT)
Dept: MEDICAL GROUP | Facility: MEDICAL CENTER | Age: 70
End: 2025-04-14
Payer: MEDICARE

## 2025-04-14 VITALS
WEIGHT: 265 LBS | DIASTOLIC BLOOD PRESSURE: 66 MMHG | HEIGHT: 69 IN | BODY MASS INDEX: 39.25 KG/M2 | HEART RATE: 102 BPM | SYSTOLIC BLOOD PRESSURE: 110 MMHG | TEMPERATURE: 97.5 F | OXYGEN SATURATION: 96 %

## 2025-04-14 DIAGNOSIS — N40.1 BENIGN PROSTATIC HYPERPLASIA WITH URINARY FREQUENCY: ICD-10-CM

## 2025-04-14 DIAGNOSIS — G62.9 NEUROPATHY: ICD-10-CM

## 2025-04-14 DIAGNOSIS — R00.2 PALPITATION: ICD-10-CM

## 2025-04-14 DIAGNOSIS — R73.02 IGT (IMPAIRED GLUCOSE TOLERANCE): ICD-10-CM

## 2025-04-14 DIAGNOSIS — M25.572 ACUTE LEFT ANKLE PAIN: ICD-10-CM

## 2025-04-14 DIAGNOSIS — I49.1 PAC (PREMATURE ATRIAL CONTRACTION): ICD-10-CM

## 2025-04-14 DIAGNOSIS — R06.83 SNORING: ICD-10-CM

## 2025-04-14 DIAGNOSIS — R35.0 BENIGN PROSTATIC HYPERPLASIA WITH URINARY FREQUENCY: ICD-10-CM

## 2025-04-14 DIAGNOSIS — E78.5 DYSLIPIDEMIA: ICD-10-CM

## 2025-04-14 PROCEDURE — 99214 OFFICE O/P EST MOD 30 MIN: CPT | Performed by: STUDENT IN AN ORGANIZED HEALTH CARE EDUCATION/TRAINING PROGRAM

## 2025-04-14 PROCEDURE — 3074F SYST BP LT 130 MM HG: CPT | Performed by: STUDENT IN AN ORGANIZED HEALTH CARE EDUCATION/TRAINING PROGRAM

## 2025-04-14 PROCEDURE — 3078F DIAST BP <80 MM HG: CPT | Performed by: STUDENT IN AN ORGANIZED HEALTH CARE EDUCATION/TRAINING PROGRAM

## 2025-04-14 ASSESSMENT — ENCOUNTER SYMPTOMS
DIZZINESS: 0
WHEEZING: 0
FEVER: 0
HEADACHES: 0
NAUSEA: 0
PALPITATIONS: 0
VOMITING: 0
SHORTNESS OF BREATH: 0
CHILLS: 0
WEIGHT LOSS: 0

## 2025-04-14 ASSESSMENT — FIBROSIS 4 INDEX: FIB4 SCORE: 0.93

## 2025-04-14 NOTE — PROGRESS NOTES
"Subjective:     CC: feet concern    HPI:   Hima presents today with      PMH HLD, IFG, BMI, history of elevated PSA, last PSA 1.23, PAC PVCs  Last visit started on tamsulosin     Verbal consent was acquired by the patient to use [employer] ambient listening note generation during this visit Yes   History of Present Illness  The patient presents for evaluation of left foot pain, blood pressure, and heart rate.    He was in usual state of health until approximately 1 week ago, he experienced a sudden onset of intense pain in his left ankle, which radiated throughout his foot, upon standing from a seated position approximately a week ago. He managed the pain with ibuprofen, which subsided after a few days. However, he has been left with residual tingling in his toes, raising concerns about potential neuropathy related to his prediabetic condition. He also reports experiencing similar symptoms in his right foot over the past 1 to 2 days. He is not experiencing any dizziness at present.    He reports urinating once or twice at night.    He was prescribed tamsulosin during his last visit, but he did not take it as his insurance did not cover it.    MEDICATIONS  Current: ibuprofen        Health Maintenance:     ROS:  Review of Systems   Constitutional:  Negative for chills, fever and weight loss.   HENT:  Negative for hearing loss.    Respiratory:  Negative for shortness of breath and wheezing.    Cardiovascular:  Negative for chest pain and palpitations.   Gastrointestinal:  Negative for nausea and vomiting.   Genitourinary:  Negative for frequency and urgency.   Skin:  Negative for rash.   Neurological:  Negative for dizziness and headaches.       Objective:     Exam:  /66 (BP Location: Right arm)   Pulse (!) 102   Temp 36.4 °C (97.5 °F) (Temporal)   Ht 1.76 m (5' 9.29\")   Wt 120 kg (265 lb)   SpO2 96%   BMI 38.81 kg/m²  Body mass index is 38.81 kg/m².    Physical Exam  Constitutional:       Appearance: " Normal appearance.   Cardiovascular:      Rate and Rhythm: Normal rate and regular rhythm.   Pulmonary:      Effort: Pulmonary effort is normal.      Breath sounds: Normal breath sounds.   Musculoskeletal:      Cervical back: Normal range of motion and neck supple.   Neurological:      Mental Status: He is alert.         Labs:     Assessment & Plan:     69 y.o. male with the following -     1. Acute left ankle pain  Resolved    2. Neuropathy  Bilateral toe neuropathy.  Etiology unclear possibly due to age.  Patient needs a well-balanced meal.  Patient declined laboratory checkup at this time we will monitor for progression.  He understands there is any progression worsening to schedule visit    3. PAC (premature atrial contraction)  4. Palpitation  On examination heart rate is irregularly irregular, therefore EKG was performed showing sinus rhythm with multiple PAC PVCs.  Rate of 89.  Patient is fairly asymptomatic.  Q wave noted on lead III isolated.  Otherwise no ST-T segment changes.  Plan   May consider echocardiogram  Patient denies any cardiac symptoms, he remains fairly active without any chest pain.  He declines any aspirin and statin medication at this time  He would like to pursue CT coronary calcium  - EKG - Clinic Performed    5. IGT (impaired glucose tolerance)  Chronic, stable  A1c checked last month was in prediabetes range discussed patient doubtful that this is the cause of his toe numbness    6. Dyslipidemia  Chronic, stable history of elevated LDL  - CT-CARDIAC SCORING; Future    7. Benign prostatic hyperplasia with urinary frequency  Patient reports symptoms fairly stable make up about once per night    8. Snoring  Discussed sleep apnea patient report he does snore however he does not want workup at this time        Return in about 4 months (around 8/14/2025) for review ct coronary .    Please note that this dictation was created using voice recognition software. I have made every reasonable  attempt to correct obvious errors, but I expect that there are errors of grammar and possibly content that I did not discover before finalizing the note.

## 2025-04-25 ENCOUNTER — APPOINTMENT (OUTPATIENT)
Dept: RADIOLOGY | Facility: IMAGING CENTER | Age: 70
End: 2025-04-25
Attending: PHYSICIAN ASSISTANT
Payer: MEDICARE

## 2025-04-25 ENCOUNTER — OFFICE VISIT (OUTPATIENT)
Dept: URGENT CARE | Facility: PHYSICIAN GROUP | Age: 70
End: 2025-04-25
Payer: MEDICARE

## 2025-04-25 VITALS
BODY MASS INDEX: 37.94 KG/M2 | OXYGEN SATURATION: 95 % | SYSTOLIC BLOOD PRESSURE: 122 MMHG | RESPIRATION RATE: 18 BRPM | TEMPERATURE: 97.7 F | WEIGHT: 265 LBS | HEIGHT: 70 IN | DIASTOLIC BLOOD PRESSURE: 70 MMHG | HEART RATE: 75 BPM

## 2025-04-25 DIAGNOSIS — M25.572 ACUTE LEFT ANKLE PAIN: ICD-10-CM

## 2025-04-25 DIAGNOSIS — S96.912A: ICD-10-CM

## 2025-04-25 PROCEDURE — 3078F DIAST BP <80 MM HG: CPT | Performed by: PHYSICIAN ASSISTANT

## 2025-04-25 PROCEDURE — 99214 OFFICE O/P EST MOD 30 MIN: CPT | Performed by: PHYSICIAN ASSISTANT

## 2025-04-25 PROCEDURE — 73610 X-RAY EXAM OF ANKLE: CPT | Mod: TC,LT | Performed by: RADIOLOGY

## 2025-04-25 PROCEDURE — 3074F SYST BP LT 130 MM HG: CPT | Performed by: PHYSICIAN ASSISTANT

## 2025-04-25 RX ORDER — METHYLPREDNISOLONE 4 MG/1
4 TABLET ORAL DAILY
Qty: 21 TABLET | Refills: 0 | Status: SHIPPED | OUTPATIENT
Start: 2025-04-25

## 2025-04-25 ASSESSMENT — ENCOUNTER SYMPTOMS
WEAKNESS: 0
TINGLING: 0

## 2025-04-25 ASSESSMENT — FIBROSIS 4 INDEX: FIB4 SCORE: 0.94

## 2025-04-25 NOTE — PROGRESS NOTES
"  Subjective:     Hima Barrios  is a 70 y.o. male who presents for Ankle Pain (Started 11 days ago, left)       He presents today for left-sided ankle pain that began 11 days ago when he was moving from a seated to a standing position.  Since that time he has been experiencing pain over the lateral ankle and lateral foot, pain does worsen with standing and weightbearing but he also has pain at rest.  His daughter is a physical therapist and has provided him recommendations and exercises for symptom improvement, has not received any benefit from these exercises.  Has been wearing a compression ankle sleeve for comfort.  No numbness/tingling or weakness experienced.  please recall he was seen by his primary care provider for this same injury on 4/14/2025, no x-ray was taken at that time.       Review of Systems   Musculoskeletal:  Positive for joint pain.   Neurological:  Negative for tingling and weakness.      No Known Allergies  Past Medical History:   Diagnosis Date    Obesity (BMI 30-39.9) 2/5/2021        Objective:   /70 (BP Location: Right arm, Patient Position: Sitting, BP Cuff Size: Large adult)   Pulse 75   Temp 36.5 °C (97.7 °F) (Temporal)   Resp 18   Ht 1.778 m (5' 10\")   Wt 120 kg (265 lb)   SpO2 95%   BMI 38.02 kg/m²   Physical Exam  Vitals and nursing note reviewed.   Constitutional:       General: He is not in acute distress.     Appearance: He is not ill-appearing or toxic-appearing.   HENT:      Head: Normocephalic.      Nose: No rhinorrhea.   Eyes:      General: No scleral icterus.     Conjunctiva/sclera: Conjunctivae normal.   Pulmonary:      Effort: Pulmonary effort is normal. No respiratory distress.      Breath sounds: No stridor.   Musculoskeletal:      Cervical back: Neck supple.        Feet:    Feet:      Comments: Tenderness palpation over the above marked region of the lateral foot and ankle.  Localized swelling present over the lateral ankle ligament structures.  " Worsened pain with passive dorsiflexion, passive inversion and active plantarflexion  Neurological:      Mental Status: He is alert and oriented to person, place, and time.   Psychiatric:         Mood and Affect: Mood normal.         Behavior: Behavior normal.         Thought Content: Thought content normal.         Judgment: Judgment normal.             Diagnostic testing:    Left ankle x-ray series  Radiologist IMPRESSION:     No evidence of fracture or dislocation.    Assessment/Plan:     Encounter Diagnoses   Name Primary?    Acute left ankle pain     Muscle strain of left ankle, initial encounter        Plan for care for today's complaint includes obtaining psychiatric reasons was needed for acute fracture.  Patient symptoms are likely related to tibialis posterior muscle strain physical exam findings.  Did offer a tall walking boot, patient Clines we will proceed with a over-the-counter lace up ankle brace with a stiff soled shoe.  Activity modification discussed.  To provide Medrol Dosepak or symptom support, encouraged over-the-counter ibuprofen or Voltaren gel encouraged with Tylenol for additional pain relief.  Vital signs were stable during today's office visit, patient was overall well-appearing. Continue to monitor symptoms and return to urgent care or follow-up with primary care provider if symptoms remain ongoing.  Follow-up in the emergency department if symptoms become severe, ER precautions discussed in office today.  Prescription for Medrol Dosepak provided.    Reviewed primary care visit from 4/14/2025, also reviewed patient's most recent CMP, GFR which was 69 and hemoglobin A1c which was 6.5.    See AVS Instructions below for written guidance provided to patient on after-visit management and care in addition to our verbal discussion during the visit.    Please note that this dictation was created using voice recognition software. I have attempted to correct all errors, but there may be  sound-alike, spelling, grammar and possibly content errors that I did not discover before finalizing the note.    Otoniel Adam SALAS

## 2025-05-22 ENCOUNTER — OFFICE VISIT (OUTPATIENT)
Dept: MEDICAL GROUP | Facility: MEDICAL CENTER | Age: 70
End: 2025-05-22
Payer: MEDICARE

## 2025-05-22 VITALS
HEART RATE: 95 BPM | HEIGHT: 70 IN | WEIGHT: 264.55 LBS | DIASTOLIC BLOOD PRESSURE: 62 MMHG | SYSTOLIC BLOOD PRESSURE: 126 MMHG | BODY MASS INDEX: 37.87 KG/M2 | OXYGEN SATURATION: 93 % | TEMPERATURE: 96.9 F

## 2025-05-22 DIAGNOSIS — G89.29 CHRONIC PAIN OF LEFT ANKLE: ICD-10-CM

## 2025-05-22 DIAGNOSIS — E78.5 DYSLIPIDEMIA: ICD-10-CM

## 2025-05-22 DIAGNOSIS — E66.01 SEVERE OBESITY (BMI 35.0-39.9) WITH COMORBIDITY (HCC): ICD-10-CM

## 2025-05-22 DIAGNOSIS — E11.9 TYPE 2 DIABETES MELLITUS WITHOUT COMPLICATION, WITHOUT LONG-TERM CURRENT USE OF INSULIN (HCC): Primary | ICD-10-CM

## 2025-05-22 DIAGNOSIS — M25.572 CHRONIC PAIN OF LEFT ANKLE: ICD-10-CM

## 2025-05-22 PROBLEM — R35.0 BENIGN PROSTATIC HYPERPLASIA WITH URINARY FREQUENCY: Status: RESOLVED | Noted: 2025-01-29 | Resolved: 2025-05-22

## 2025-05-22 PROBLEM — N40.1 BENIGN PROSTATIC HYPERPLASIA WITH URINARY FREQUENCY: Status: RESOLVED | Noted: 2025-01-29 | Resolved: 2025-05-22

## 2025-05-22 PROBLEM — R73.02 IGT (IMPAIRED GLUCOSE TOLERANCE): Status: RESOLVED | Noted: 2022-06-13 | Resolved: 2025-05-22

## 2025-05-22 PROCEDURE — 3074F SYST BP LT 130 MM HG: CPT | Performed by: HEALTH CARE PROVIDER

## 2025-05-22 PROCEDURE — 99214 OFFICE O/P EST MOD 30 MIN: CPT | Performed by: HEALTH CARE PROVIDER

## 2025-05-22 PROCEDURE — 3078F DIAST BP <80 MM HG: CPT | Performed by: HEALTH CARE PROVIDER

## 2025-05-22 RX ORDER — METFORMIN HYDROCHLORIDE 500 MG/1
500 TABLET, EXTENDED RELEASE ORAL DAILY
Qty: 100 TABLET | Refills: 3 | Status: SHIPPED | OUTPATIENT
Start: 2025-05-22 | End: 2026-06-26

## 2025-05-22 ASSESSMENT — ENCOUNTER SYMPTOMS
FEVER: 0
SHORTNESS OF BREATH: 0
DIZZINESS: 0
CHILLS: 0
PALPITATIONS: 0

## 2025-05-22 ASSESSMENT — FIBROSIS 4 INDEX: FIB4 SCORE: 0.94

## 2025-05-22 NOTE — ASSESSMENT & PLAN NOTE
New diagnosis chronic condition.  Lab Results   Component Value Date/Time    HBA1C 6.5 (H) 02/24/2025 09:25 AM       - initiate metformin   -     Orders:    metFORMIN ER (GLUCOPHAGE XR) 500 MG TABLET SR 24 HR; Take 1 Tablet by mouth every day.    Basic Metabolic Panel; Future    HEMOGLOBIN A1C; Future

## 2025-05-22 NOTE — ASSESSMENT & PLAN NOTE
Chronic, on controlled.    - He is on no medication at this time for primary prevention   - The  last cholesterol panel in 02/2025 was not within normal limits but significantly improved from previous lab panels that I can see, without medication, just lifestyle measures.   -He wishes to defer medication at this time and we will check his lipid panel again in about 6 months, make determination at that time if a statin is needed especially in the setting of his diabetes which we initiated treatment for today.   - Last Labs:   Lab Results   Component Value Date/Time    CHOLSTRLTOT 195 02/24/2025 09:25 AM    TRIGLYCERIDE 135 02/24/2025 09:25 AM    HDL 41 02/24/2025 09:25 AM     (H) 02/24/2025 09:25 AM         Orders:    Basic Metabolic Panel; Future    HEMOGLOBIN A1C; Future

## 2025-05-22 NOTE — PROGRESS NOTES
Subjective:     CC:    Chief Complaint   Patient presents with    Establish Care     New to You     Ankle Injury     About 2 month ago patient twisted his left ankle, its been getting worse        HISTORY OF THE PRESENT ILLNESS: Patient is a 70 y.o. male. This pleasant patient is here today to establish care and discuss left ankle pain with onset in late March 2025.  He states that he rolled his ankle on an uneven piece of concrete believes his foot rolled inwards.  States he had pain immediately after and tried to rest it but after about a month it was not feeling better and saw his primary care, did not like the answer he got from them and then sought care at an urgent care a couple days later.  X-rays at the urgent care were negative for fracture, recommended to use a brace and basic sprain care procedures.  He still deals with intermittent pain and significant swelling with activity around the outside of his ankle that wraps around down to the bottom of his foot.  Denies any numbness, history of previous fractures or injury to this area.. His/her prior PCP was Dr. Maynard.    Problem   Type 2 Diabetes Mellitus Without Complication, Without Long-Term Current Use of Insulin ([facility])      Current medications: None    Last A1c:   Lab Results   Component Value Date/Time    HBA1C 6.5 (H) 02/24/2025 09:25 AM   Last Microalb/Cr ratio: never  Fasting sugars: none  Last diabetic foot exam: never   Last retinal eye exam:never  ACEi/ARB: none    Statin: non  Aspirin: no  Concomitant HTN: no  Tobacco:  Nightly foot checks?    Complications  None        Dyslipidemia    Patient is taking no medication at this time  Has been making attempts to change his diet and exercise but ankle injury has been interfering with this.  Last lipid panel:   Lab Results   Component Value Date/Time    CHOLSTRLTOT 195 02/24/2025 0925    TRIGLYCERIDE 135 02/24/2025 0925    HDL 41 02/24/2025 0925     (H) 02/24/2025 0925      10 Year ASCVD  "Risk:  The 10-year ASCVD risk score (Lyndsey YUAN, et al., 2019) is: 19%           Benign Prostatic Hyperplasia With Urinary Frequency (Resolved)   Igt (Impaired Glucose Tolerance) (Resolved)        Results for orders placed in visit on 04/25/25    DX-ANKLE 3+ VIEWS LEFT    Impression  No evidence of fracture or dislocation.                                                 ROS:   Review of Systems   Constitutional:  Negative for chills, fever and malaise/fatigue.   Respiratory:  Negative for shortness of breath.    Cardiovascular:  Negative for chest pain and palpitations.   Musculoskeletal:  Positive for joint pain (Left ankle, with swelling).   Neurological:  Negative for dizziness.         Objective:     Exam: /62 (BP Location: Left arm, Patient Position: Sitting, BP Cuff Size: Adult)   Pulse 95   Temp 36.1 °C (96.9 °F) (Temporal)   Ht 1.778 m (5' 10\")   Wt 120 kg (264 lb 8.8 oz)   SpO2 93%  Body mass index is 37.96 kg/m².    Physical Exam  Constitutional:       General: He is not in acute distress.     Appearance: Normal appearance.   HENT:      Head: Normocephalic.   Eyes:      Conjunctiva/sclera: Conjunctivae normal.   Cardiovascular:      Heart sounds: No murmur heard.  Pulmonary:      Effort: Pulmonary effort is normal. No respiratory distress.   Musculoskeletal:      Comments: MSK: Patient is able to bear weight on left foot. Slightly antalgic gait, lysing cane  Ankles: No noticeable deformity, but visibly obvious diffuse swelling without ecchymosis ROM intact. Tenderness to palpation over the left ATFL {No tenderness to palpation along posterior edge of lateral and medial malleoli, base of 5th metatarsal or navicular bone. No tenderness along fibula.  5/5 strength bilaterally.Sensation intact. 2+ pedal pulses. Ankle reflex 2+. Squeeze test negative. External rotation test positive. Anterior drawer negative. Talar tilt negative.    Lymphadenopathy:      Cervical: No cervical adenopathy. "   Neurological:      General: No focal deficit present.      Mental Status: He is alert.   Psychiatric:         Mood and Affect: Mood normal.         Behavior: Behavior normal.             Assessment & Plan:   70 y.o. male with the following -    Assessment & Plan  Type 2 diabetes mellitus without complication, without long-term current use of insulin ([facility])  New diagnosis chronic condition.  Lab Results   Component Value Date/Time    HBA1C 6.5 (H) 02/24/2025 09:25 AM       - initiate metformin   -     Orders:    metFORMIN ER (GLUCOPHAGE XR) 500 MG TABLET SR 24 HR; Take 1 Tablet by mouth every day.    Basic Metabolic Panel; Future    HEMOGLOBIN A1C; Future    Chronic pain of left ankle  Chronic.   - History and physical examination most concerning for possible significant soft tissue injury to include ligamentous tears that were not visible on x-ray at the urgent care   - I have personally reviewed his x-ray from the urgent care and I concur with the radiologist opinion of no fractures.   -Other possible diagnoses could include a peroneal tendinitis.   - At this point I believe an MRI would be extremely valuable for further evaluation of now chronic soft tissue injury.   -Referral to physical therapy to begin the rehabilitation process, feel comfortable with this after reviewing his plain film x-rays and ensuring no bony fragments.    Orders:    MR-ANKLE W/O LEFT; Future    Referral to Physical Therapy    Dyslipidemia  Chronic, on controlled.    - He is on no medication at this time for primary prevention   - The  last cholesterol panel in 02/2025 was not within normal limits but significantly improved from previous lab panels that I can see, without medication, just lifestyle measures.   -He wishes to defer medication at this time and we will check his lipid panel again in about 6 months, make determination at that time if a statin is needed especially in the setting of his diabetes which we initiated treatment for  today.   - Last Labs:   Lab Results   Component Value Date/Time    CHOLSTRLTOT 195 02/24/2025 09:25 AM    TRIGLYCERIDE 135 02/24/2025 09:25 AM    HDL 41 02/24/2025 09:25 AM     (H) 02/24/2025 09:25 AM         Orders:    Basic Metabolic Panel; Future    HEMOGLOBIN A1C; Future    Severe obesity (BMI 35.0-39.9) with comorbidity (HCC)  Chronic, unstable.   -We discussed at length lifestyle measures to not only improve his diabetes, dyslipidemia but his overall health and recovery from this ankle injury.  Orders:    Basic Metabolic Panel; Future    HEMOGLOBIN A1C; Future           Return in about 3 months (around 8/22/2025) for Lab F/U, Diabetes F/U, Med Check.    Please note that this dictation was created using voice recognition software. I have made every reasonable attempt to correct obvious errors, but I expect that there are errors of grammar and possibly content that I did not discover before finalizing the note.

## 2025-05-22 NOTE — ASSESSMENT & PLAN NOTE
Chronic, unstable.   -We discussed at length lifestyle measures to not only improve his diabetes, dyslipidemia but his overall health and recovery from this ankle injury.  Orders:    Basic Metabolic Panel; Future    HEMOGLOBIN A1C; Future

## 2025-06-15 ENCOUNTER — HOSPITAL ENCOUNTER (OUTPATIENT)
Dept: RADIOLOGY | Facility: MEDICAL CENTER | Age: 70
End: 2025-06-15
Attending: HEALTH CARE PROVIDER
Payer: MEDICARE

## 2025-06-15 DIAGNOSIS — G89.29 CHRONIC PAIN OF LEFT ANKLE: ICD-10-CM

## 2025-06-15 DIAGNOSIS — M25.572 CHRONIC PAIN OF LEFT ANKLE: ICD-10-CM

## 2025-06-15 PROCEDURE — 73721 MRI JNT OF LWR EXTRE W/O DYE: CPT | Mod: LT

## 2025-06-16 ENCOUNTER — RESULTS FOLLOW-UP (OUTPATIENT)
Dept: MEDICAL GROUP | Facility: MEDICAL CENTER | Age: 70
End: 2025-06-16

## 2025-07-21 ENCOUNTER — APPOINTMENT (OUTPATIENT)
Dept: MEDICAL GROUP | Facility: MEDICAL CENTER | Age: 70
End: 2025-07-21
Payer: MEDICARE

## 2025-07-22 ENCOUNTER — HOSPITAL ENCOUNTER (OUTPATIENT)
Facility: MEDICAL CENTER | Age: 70
End: 2025-07-22
Attending: HEALTH CARE PROVIDER
Payer: MEDICARE

## 2025-07-22 DIAGNOSIS — E66.01 SEVERE OBESITY (BMI 35.0-39.9) WITH COMORBIDITY (HCC): ICD-10-CM

## 2025-07-22 DIAGNOSIS — E78.5 DYSLIPIDEMIA: ICD-10-CM

## 2025-07-22 DIAGNOSIS — E11.9 TYPE 2 DIABETES MELLITUS WITHOUT COMPLICATION, WITHOUT LONG-TERM CURRENT USE OF INSULIN (HCC): ICD-10-CM

## 2025-07-22 LAB
ANION GAP SERPL CALC-SCNC: 12 MMOL/L (ref 7–16)
BUN SERPL-MCNC: 22 MG/DL (ref 8–22)
CALCIUM SERPL-MCNC: 8.8 MG/DL (ref 8.5–10.5)
CHLORIDE SERPL-SCNC: 104 MMOL/L (ref 96–112)
CO2 SERPL-SCNC: 21 MMOL/L (ref 20–33)
CREAT SERPL-MCNC: 1.01 MG/DL (ref 0.5–1.4)
EST. AVERAGE GLUCOSE BLD GHB EST-MCNC: 117 MG/DL
GFR SERPLBLD CREATININE-BSD FMLA CKD-EPI: 80 ML/MIN/1.73 M 2
GLUCOSE SERPL-MCNC: 112 MG/DL (ref 65–99)
HBA1C MFR BLD: 5.7 % (ref 4–5.6)
POTASSIUM SERPL-SCNC: 4 MMOL/L (ref 3.6–5.5)
SODIUM SERPL-SCNC: 137 MMOL/L (ref 135–145)

## 2025-07-22 PROCEDURE — 83036 HEMOGLOBIN GLYCOSYLATED A1C: CPT

## 2025-07-22 PROCEDURE — 80048 BASIC METABOLIC PNL TOTAL CA: CPT

## 2025-07-22 PROCEDURE — 36415 COLL VENOUS BLD VENIPUNCTURE: CPT

## 2025-07-23 ENCOUNTER — OFFICE VISIT (OUTPATIENT)
Dept: MEDICAL GROUP | Facility: MEDICAL CENTER | Age: 70
End: 2025-07-23
Payer: MEDICARE

## 2025-07-23 ENCOUNTER — HOSPITAL ENCOUNTER (OUTPATIENT)
Facility: MEDICAL CENTER | Age: 70
End: 2025-07-23
Attending: HEALTH CARE PROVIDER
Payer: MEDICARE

## 2025-07-23 VITALS
BODY MASS INDEX: 36.22 KG/M2 | HEART RATE: 108 BPM | HEIGHT: 70 IN | SYSTOLIC BLOOD PRESSURE: 118 MMHG | DIASTOLIC BLOOD PRESSURE: 80 MMHG | TEMPERATURE: 98.9 F | WEIGHT: 253 LBS | OXYGEN SATURATION: 95 %

## 2025-07-23 DIAGNOSIS — E11.9 TYPE 2 DIABETES MELLITUS WITHOUT COMPLICATION, WITHOUT LONG-TERM CURRENT USE OF INSULIN (HCC): Primary | ICD-10-CM

## 2025-07-23 DIAGNOSIS — E78.5 DYSLIPIDEMIA: ICD-10-CM

## 2025-07-23 DIAGNOSIS — E11.9 TYPE 2 DIABETES MELLITUS WITHOUT COMPLICATION, WITHOUT LONG-TERM CURRENT USE OF INSULIN (HCC): ICD-10-CM

## 2025-07-23 PROCEDURE — 82570 ASSAY OF URINE CREATININE: CPT

## 2025-07-23 PROCEDURE — 3074F SYST BP LT 130 MM HG: CPT | Performed by: HEALTH CARE PROVIDER

## 2025-07-23 PROCEDURE — 99214 OFFICE O/P EST MOD 30 MIN: CPT | Performed by: HEALTH CARE PROVIDER

## 2025-07-23 PROCEDURE — 3079F DIAST BP 80-89 MM HG: CPT | Performed by: HEALTH CARE PROVIDER

## 2025-07-23 PROCEDURE — 82043 UR ALBUMIN QUANTITATIVE: CPT

## 2025-07-23 PROCEDURE — G2211 COMPLEX E/M VISIT ADD ON: HCPCS | Performed by: HEALTH CARE PROVIDER

## 2025-07-23 ASSESSMENT — ENCOUNTER SYMPTOMS
CHILLS: 0
SHORTNESS OF BREATH: 0
PALPITATIONS: 0
FEVER: 0
DIZZINESS: 0

## 2025-07-23 ASSESSMENT — FIBROSIS 4 INDEX: FIB4 SCORE: 0.94

## 2025-07-23 NOTE — ASSESSMENT & PLAN NOTE
Chronic, uncontrolled.    - He is on no medication at this time for primary prevention and tolerating it well.    - The  last cholesterol panel in 02/2025 was not within normal limits, mildly elevated LDL.  He is wishing to refrain from medications so we will order a new fasting lipid panel to be performed on about 8/24/2025  Lab Results   Component Value Date/Time    CHOLSTRLTOT 195 02/24/2025 09:25 AM    TRIGLYCERIDE 135 02/24/2025 09:25 AM    HDL 41 02/24/2025 09:25 AM     (H) 02/24/2025 09:25 AM         Orders:    Lipid Profile; Future

## 2025-07-23 NOTE — PROGRESS NOTES
"Subjective:     CC:   Chief Complaint   Patient presents with    Follow-Up        HPI:   Hima presents today with    Problem   Type 2 Diabetes Mellitus Without Complication, Without Long-Term Current Use of Insulin ([facility])      Current medications: Metformin 500 mg extended release, states he has not been good about taking his medication, thinks on average about 2 times a week.  He has been making significant diet changes cutting all processed carbohydrates out, breakfast of yogurt and granola dinners with meat, salad, wild rice.    Last A1c:   Lab Results   Component Value Date/Time    HBA1C 5.7 (H) 07/22/2025 08:35 AM     Last Microalb/Cr ratio: never  Fasting sugars: none  Last diabetic foot exam: never   Last retinal eye exam:never  ACEi/ARB: none    Statin: non  Aspirin: no  Concomitant HTN: no      Complications  None        Dyslipidemia    Patient is taking no medication at this time  Has been making attempts to change his diet and exercise but ankle injury has been interfering with this.  Last lipid panel:   Lab Results   Component Value Date/Time    CHOLSTRLTOT 195 02/24/2025 0925    TRIGLYCERIDE 135 02/24/2025 0925    HDL 41 02/24/2025 0925     (H) 02/24/2025 0925      10 Year ASCVD Risk:  The 10-year ASCVD risk score (Lyndsey YUAN, et al., 2019) is: 19%                   ROS:  Review of Systems   Constitutional:  Negative for chills, fever and malaise/fatigue.   Respiratory:  Negative for shortness of breath.    Cardiovascular:  Negative for chest pain and palpitations.   Neurological:  Negative for dizziness.       Objective:     Exam:  /80   Pulse (!) 108   Temp 37.2 °C (98.9 °F) (Temporal)   Ht 1.778 m (5' 10\")   Wt 115 kg (253 lb)   SpO2 95%   BMI 36.30 kg/m²  Body mass index is 36.3 kg/m².    Physical Exam  Constitutional:       General: He is not in acute distress.     Appearance: Normal appearance.   HENT:      Head: Normocephalic.   Eyes:      Conjunctiva/sclera: Conjunctivae " normal.   Cardiovascular:      Rate and Rhythm: Normal rate and regular rhythm.      Heart sounds: No murmur heard.  Pulmonary:      Effort: Pulmonary effort is normal. No respiratory distress.   Feet:      Comments: Diabetic foot exam: No lesions or calluses noted. 2+ pedal pulses. Sensation intact with 10 out of 10 on monofilament test.    Lymphadenopathy:      Cervical: No cervical adenopathy.   Neurological:      General: No focal deficit present.      Mental Status: He is alert.   Psychiatric:         Mood and Affect: Mood normal.         Behavior: Behavior normal.           Assessment & Plan:     70 y.o. male with the following -     Assessment & Plan  Dyslipidemia  Chronic, uncontrolled.    - He is on no medication at this time for primary prevention and tolerating it well.    - The  last cholesterol panel in 02/2025 was not within normal limits, mildly elevated LDL.  He is wishing to refrain from medications so we will order a new fasting lipid panel to be performed on about 8/24/2025  Lab Results   Component Value Date/Time    CHOLSTRLTOT 195 02/24/2025 09:25 AM    TRIGLYCERIDE 135 02/24/2025 09:25 AM    HDL 41 02/24/2025 09:25 AM     (H) 02/24/2025 09:25 AM         Orders:    Lipid Profile; Future    Type 2 diabetes mellitus without complication, without long-term current use of insulin (HCC)  Chronic, controlled.    Lab Results   Component Value Date/Time    HBA1C 5.7 (H) 07/22/2025 08:35 AM        - Hemoglobin A1c is under 7.0%.     - He is up-to-date with all diabetic screenings as they were performed today, not on an ACE inhibitor for renal protection, and is not on a statin for cardiovascular protection.     - We will continue the current regimen of metformin 500 mg extended release once daily with meals  - Unable to get retina scan in clinic today, referred to ophthalmology      Orders:    Microalbumin Creat Ratio Urine (Clinic Collect); Future    POCT Retinal Eye Exam    Diabetic Monofilament  LE Exam    Referral to Ophthalmology                Return in about 3 months (around 10/23/2025).    Please note that this dictation was created using voice recognition software. I have made every reasonable attempt to correct obvious errors, but I expect that there are errors of grammar and possibly content that I did not discover before finalizing the note.

## 2025-07-23 NOTE — ASSESSMENT & PLAN NOTE
Chronic, controlled.    Lab Results   Component Value Date/Time    HBA1C 5.7 (H) 07/22/2025 08:35 AM        - Hemoglobin A1c is under 7.0%.     - He is up-to-date with all diabetic screenings as they were performed today, not on an ACE inhibitor for renal protection, and is not on a statin for cardiovascular protection.     - We will continue the current regimen of metformin 500 mg extended release once daily with meals  - Unable to get retina scan in clinic today, referred to ophthalmology      Orders:    Microalbumin Creat Ratio Urine (Clinic Collect); Future    POCT Retinal Eye Exam    Diabetic Monofilament LE Exam    Referral to Ophthalmology

## 2025-07-24 LAB
CREAT UR-MCNC: 161 MG/DL
MICROALBUMIN UR-MCNC: <1.2 MG/DL
MICROALBUMIN/CREAT UR: NORMAL MG/G (ref 0–30)

## 2025-07-29 NOTE — Clinical Note
REFERRAL APPROVAL NOTICE         Sent on July 29, 2025                   Hima Barrios  [address]  [place] [address]                   Dear Mr. Barrios,    After a careful review of the medical information and benefit coverage, [facility] has processed your referral. See below for additional details.    If applicable, you must be actively enrolled with your insurance for coverage of the authorized service. If you have any questions regarding your coverage, please contact your insurance directly.    REFERRAL INFORMATION   Referral #:  [id number]  Referred-To Provider    Referred-By Provider:  Ophthalmology    DIANE Wright PETER D      [address]  [place] [address]  [phone] [address]  [place] [address]  [phone]    Referral Start Date:  07/23/2025  Referral End Date:   07/23/2026             SCHEDULING  If you do not already have an appointment, please call [phone] to make an appointment.     MORE INFORMATION  If you do not already have a [employer] account, sign up at: [email or web address]  You can access your medical information, make appointments, see lab results, billing information, and more.  If you have questions regarding this referral, please contact  the [facility] Referrals department at:             [phone]. Monday - Friday 8:00AM - 5:00PM.     Sincerely,    [facility]

## 2025-07-30 ENCOUNTER — PHYSICAL THERAPY (OUTPATIENT)
Dept: PHYSICAL THERAPY | Facility: MEDICAL CENTER | Age: 70
End: 2025-07-30
Attending: HEALTH CARE PROVIDER
Payer: MEDICARE

## 2025-07-30 DIAGNOSIS — M25.572 CHRONIC PAIN OF LEFT ANKLE: Primary | ICD-10-CM

## 2025-07-30 DIAGNOSIS — G89.29 CHRONIC PAIN OF LEFT ANKLE: Primary | ICD-10-CM

## 2025-07-30 PROCEDURE — 97161 PT EVAL LOW COMPLEX 20 MIN: CPT | Performed by: PHYSICAL THERAPIST

## 2025-07-30 PROCEDURE — 97110 THERAPEUTIC EXERCISES: CPT | Performed by: PHYSICAL THERAPIST

## 2025-07-30 SDOH — ECONOMIC STABILITY: GENERAL: QUALITY OF LIFE: GOOD

## 2025-07-30 ASSESSMENT — ENCOUNTER SYMPTOMS
EXACERBATED BY: TWISTING
PAIN TIMING: INTERMITTENT
PAIN TIMING: WHEN ACTIVE
ALLEVIATING FACTORS: REST
ALLEVIATING FACTORS: ICE
EXACERBATED BY: WALKING
QUALITY: ACHING

## 2025-07-30 NOTE — OP THERAPY EVALUATION
Outpatient Physical Therapy  INITIAL EVALUATION    [facility] Outpatient Physical Therapy  [address]  [place] [address]  Phone:  [phone]  Fax:  [phone]    Date of Evaluation: 07/30/2025    Patient: Hima Barrios  YOB: 1955  MRN: [id number]     Referring Provider: Danish Dudley P.A.-C.  [address]  [address]  [place] [address]   Referring Diagnosis Chronic pain of left ankle [M25.572, G89.29]     Time Calculation  Start time: 0730  Stop time: 0810 Time Calculation (min): 40 minutes         Chief Complaint: Ankle Problem    Visit Diagnoses     ICD-10-CM   1. Chronic pain of left ankle  M25.572    G89.29       Date of onset of impairment: 3/30/2025    Subjective:   History of Present Illness:     Date of onset:  3/30/2025    Mechanism of injury:  Hima reports that he sprained his ankle when he went to stand up from a chair. He felt pain right away. He tried to self treat with RICE.    However, he thinks that he re injured it a little bit when he stepped on some uneven concrete. When that happened, he went to urgent care and got some steroids. That helped, but his discomfort and limitation has lingered.    Eventually he got an MRI which showed partial tears to the outside of ankle.    He feels like it has already healed up quite a bit on his own over time. He does still feel discomfort on the outside back part of the bone on the outside of his ankle.    His daughter is a PT and gave him some general advice.  Quality of life:  Good  Pain:     Quality:  Aching    Pain timing:  Intermittent and when active    Relieving factors:  Ice and rest    Aggravating factors:  Walking and twisting    Progression:  Improving  Diagnostic Tests:     X-ray: abnormal      MRI studies: abnormal      Diagnostic Tests Comments:  Please see imaging reports  Treatments:     Current treatment:  RICE  Patient Goals:     Patient goals for therapy:   Decreased pain, increased strength, increased motion and return to sport/leisure activities      Past Medical History[1]  Past Surgical History[2]  Social History     Tobacco Use    Smoking status: Former     Current packs/day: 0.00     Average packs/day: 1.5 packs/day for 26.0 years (39.0 ttl pk-yrs)     Types: Cigarettes     Start date:      Quit date:      Years since quittin.5     Passive exposure: Never    Smokeless tobacco: Never   Substance Use Topics    Alcohol use: Yes     Comment: very rarely.     Social Hx - Family and Occupational[3]    Objective     Palpation   Left   Tenderness of the peroneus and posterior tibialis.     Tenderness   Left Ankle/Foot   Tenderness in the anterior talofibular ligament, calcaneofibular ligament and posterior talofibular ligament.     Active Range of Motion   Left Ankle/Foot   Dorsiflexion (ke): 0 degrees   Plantar flexion: 45 degrees   Inversion: 45 degrees with pain  Eversion: 40 degrees     Right Ankle/Foot   Dorsiflexion (ke): 10 degrees   Plantar flexion: 50 degrees   Inversion: 40 degrees   Eversion: 40 degrees     Strength:      Left Ankle/Foot   Dorsiflexion: 4  Plantar flexion: 3+ (pain)  Inversion: 4- (pain)  Eversion: 4- (pain)    Right Ankle/Foot   Dorsiflexion: 4  Plantar flexion: 3+  Inversion: 3+  Eversion: 3+    Tests   Left Ankle/Foot   Positive for inversion talar tilt.         Therapeutic Exercises (CPT 84129):     1. standing heel raise    2. ankle ABC    3. ankle eversion band    4. posterior tibialis band    5. sit to stand    6. semi tandem at counter      Therapeutic Exercise Summary: Access Code: [email or web address]  URL: [email or web address]  Date: 2025  Prepared by: Peterson Raygoza    Exercises  - Standing Heel Raise with Chair Support  - 3-5 x weekly - 2 sets - 20 reps  - Seated Ankle Alphabet  - 3-5 x weekly - 2 sets  - Seated Ankle Eversion with Resistance  - 3-5 x weekly - 2 sets  - Seated Figure 4 Ankle Inversion with Resistance   - 3-5 x weekly - 2 sets  - Sit to Stand Without Arm Support  - 3-5 x weekly - 2 sets  - Semi-Tandem Balance at Counter Top Eyes Open  - 3-5 x weekly - 2 sets      Time-based treatments/modalities:    Physical Therapy Timed Treatment Charges  Therapeutic exercise minutes (CPT 82282): 10 minutes    Assessment, Response and Plan:   Impairments: abnormal or restricted ROM, hypersensitivity, impaired physical strength and lacks appropriate home exercise program    Assessment details:  Patient is a pleasant 71 yo male c c/o mild pain and physical limitation after a L ankle inversion sprain at the end of March. He has already had an MRI which revealed partial thickness tears of the anterior talofibular, calcaneofibular and posterior talofibular ligaments. Patient presentation at this time consistent with imaging and referral. Along with this, Hima presents with mild deficits to L ankle AROM, strength, and presents with regional hypersensitivity and functional limitations. He will benefit from a comprehensive POC and HEP in skilled PT to address his deficits and restore full pain free function.   Prognosis: good    Goals:   Short Term Goals:   1. Patient will demonstrate independent regular performance of tailored home exercise program in order to facilitate recovery and promote self treatment and patient ownership of recovery   2. Patient will demo L ankle DF AROM >/= 10 deg in order to enable normal gait mechanics without compensation  Short term goal time span:  1-2 weeks      Long Term Goals:    1. Patient will demo L ankle PF strength >/= 4+/5 pain free  2. Patient will demo L ankle inversion / eversion strength >/= 4/5 without pain to indicate lateral control needed to navigate uneven ground with minimized risk of sprain reoccurrence  3. Patient will report ability to complete at least 1 mi hike without pain during or in the 24 hours afterward to indicate readiness to return to full function especially so he can  engage in healthy exercise of choice  Long term goal time span:  6-8 weeks    Plan:   Planned therapy interventions:  Neuromuscular Re-education (CPT 19515), Therapeutic Activities (CPT 72932), Therapeutic Exercise (CPT 60478) and Manual Therapy (CPT 83218)  Frequency:  2x week  Duration in weeks:  6  Discussed with:  Patient      Functional Assessment Used        Referring provider co-signature:  I have reviewed this plan of care and my co-signature certifies the need for services.    Certification Period: 07/30/2025 to  09/10/25    Physician Signature: ________________________________ Date: ______________                      [1]   Past Medical History:  Diagnosis Date    Obesity (BMI 30-39.9) 2/5/2021   [2]   Past Surgical History:  Procedure Laterality Date    APPENDECTOMY     [3]   Family and Occupational History  Socioeconomic History    Marital status:      Spouse name: Not on file    Number of children: Not on file    Years of education: Not on file    Highest education level: Master's degree (e.g., MA, MS, [place], MEd, MSW, MARCUS)   Occupational History    Not on file

## 2025-08-07 ENCOUNTER — TELEPHONE (OUTPATIENT)
Dept: MEDICAL GROUP | Facility: MEDICAL CENTER | Age: 70
End: 2025-08-07
Payer: MEDICARE

## 2025-08-13 ENCOUNTER — PHYSICAL THERAPY (OUTPATIENT)
Dept: PHYSICAL THERAPY | Facility: MEDICAL CENTER | Age: 70
End: 2025-08-13
Attending: HEALTH CARE PROVIDER
Payer: MEDICARE

## 2025-08-13 DIAGNOSIS — M25.572 CHRONIC PAIN OF LEFT ANKLE: Primary | ICD-10-CM

## 2025-08-13 DIAGNOSIS — G89.29 CHRONIC PAIN OF LEFT ANKLE: Primary | ICD-10-CM

## 2025-08-13 PROCEDURE — 97110 THERAPEUTIC EXERCISES: CPT | Performed by: PHYSICAL THERAPIST

## 2025-08-13 PROCEDURE — 97112 NEUROMUSCULAR REEDUCATION: CPT | Performed by: PHYSICAL THERAPIST

## 2025-08-20 ENCOUNTER — PHYSICAL THERAPY (OUTPATIENT)
Dept: PHYSICAL THERAPY | Facility: MEDICAL CENTER | Age: 70
End: 2025-08-20
Attending: HEALTH CARE PROVIDER
Payer: MEDICARE

## 2025-08-20 DIAGNOSIS — G89.29 CHRONIC PAIN OF LEFT ANKLE: Primary | ICD-10-CM

## 2025-08-20 DIAGNOSIS — M25.572 CHRONIC PAIN OF LEFT ANKLE: Primary | ICD-10-CM

## 2025-08-20 PROCEDURE — 97110 THERAPEUTIC EXERCISES: CPT | Performed by: PHYSICAL THERAPIST

## 2025-08-20 PROCEDURE — 97112 NEUROMUSCULAR REEDUCATION: CPT | Performed by: PHYSICAL THERAPIST
